# Patient Record
Sex: MALE | Race: WHITE | Employment: FULL TIME | URBAN - METROPOLITAN AREA
[De-identification: names, ages, dates, MRNs, and addresses within clinical notes are randomized per-mention and may not be internally consistent; named-entity substitution may affect disease eponyms.]

---

## 2017-08-02 PROBLEM — D64.9 ANEMIA: Status: ACTIVE | Noted: 2017-08-02

## 2020-11-09 ENCOUNTER — HOSPITAL ENCOUNTER (INPATIENT)
Age: 75
LOS: 2 days | Discharge: HOME HEALTH | DRG: 066 | End: 2020-11-11
Attending: EMERGENCY MEDICINE | Admitting: INTERNAL MEDICINE
Payer: COMMERCIAL

## 2020-11-09 ENCOUNTER — APPOINTMENT (OUTPATIENT)
Dept: CT IMAGING | Age: 75
DRG: 066 | End: 2020-11-09
Attending: EMERGENCY MEDICINE
Payer: COMMERCIAL

## 2020-11-09 DIAGNOSIS — I63.9 ACUTE ISCHEMIC STROKE (HCC): Primary | ICD-10-CM

## 2020-11-09 LAB
ALBUMIN SERPL-MCNC: 3.3 G/DL (ref 3.5–5)
ALBUMIN/GLOB SERPL: 1 {RATIO} (ref 1.1–2.2)
ALP SERPL-CCNC: 69 U/L (ref 45–117)
ALT SERPL-CCNC: 33 U/L (ref 12–78)
ANION GAP SERPL CALC-SCNC: 6 MMOL/L (ref 5–15)
AST SERPL-CCNC: 29 U/L (ref 15–37)
ATRIAL RATE: 65 BPM
BASOPHILS # BLD: 0 K/UL (ref 0–0.1)
BASOPHILS NFR BLD: 0 % (ref 0–1)
BILIRUB SERPL-MCNC: 0.6 MG/DL (ref 0.2–1)
BUN SERPL-MCNC: 26 MG/DL (ref 6–20)
BUN/CREAT SERPL: 20 (ref 12–20)
CALCIUM SERPL-MCNC: 9.5 MG/DL (ref 8.5–10.1)
CALCULATED P AXIS, ECG09: -11 DEGREES
CALCULATED R AXIS, ECG10: 30 DEGREES
CALCULATED T AXIS, ECG11: 24 DEGREES
CHLORIDE SERPL-SCNC: 101 MMOL/L (ref 97–108)
CO2 SERPL-SCNC: 28 MMOL/L (ref 21–32)
COMMENT, HOLDF: NORMAL
CREAT SERPL-MCNC: 1.29 MG/DL (ref 0.7–1.3)
DIAGNOSIS, 93000: NORMAL
DIFFERENTIAL METHOD BLD: ABNORMAL
EOSINOPHIL # BLD: 0.2 K/UL (ref 0–0.4)
EOSINOPHIL NFR BLD: 3 % (ref 0–7)
ERYTHROCYTE [DISTWIDTH] IN BLOOD BY AUTOMATED COUNT: 12.9 % (ref 11.5–14.5)
GLOBULIN SER CALC-MCNC: 3.3 G/DL (ref 2–4)
GLUCOSE BLD STRIP.AUTO-MCNC: 105 MG/DL (ref 65–100)
GLUCOSE SERPL-MCNC: 103 MG/DL (ref 65–100)
HCT VFR BLD AUTO: 35.2 % (ref 36.6–50.3)
HGB BLD-MCNC: 11.7 G/DL (ref 12.1–17)
IMM GRANULOCYTES # BLD AUTO: 0 K/UL (ref 0–0.04)
IMM GRANULOCYTES NFR BLD AUTO: 1 % (ref 0–0.5)
LYMPHOCYTES # BLD: 1.1 K/UL (ref 0.8–3.5)
LYMPHOCYTES NFR BLD: 12 % (ref 12–49)
MCH RBC QN AUTO: 33.1 PG (ref 26–34)
MCHC RBC AUTO-ENTMCNC: 33.2 G/DL (ref 30–36.5)
MCV RBC AUTO: 99.4 FL (ref 80–99)
MONOCYTES # BLD: 0.9 K/UL (ref 0–1)
MONOCYTES NFR BLD: 10 % (ref 5–13)
NEUTS SEG # BLD: 6.3 K/UL (ref 1.8–8)
NEUTS SEG NFR BLD: 74 % (ref 32–75)
NRBC # BLD: 0 K/UL (ref 0–0.01)
NRBC BLD-RTO: 0 PER 100 WBC
P-R INTERVAL, ECG05: 152 MS
PLATELET # BLD AUTO: 175 K/UL (ref 150–400)
PMV BLD AUTO: 9.5 FL (ref 8.9–12.9)
POTASSIUM SERPL-SCNC: 3.8 MMOL/L (ref 3.5–5.1)
PROT SERPL-MCNC: 6.6 G/DL (ref 6.4–8.2)
Q-T INTERVAL, ECG07: 412 MS
QRS DURATION, ECG06: 92 MS
QTC CALCULATION (BEZET), ECG08: 428 MS
RBC # BLD AUTO: 3.54 M/UL (ref 4.1–5.7)
SAMPLES BEING HELD,HOLD: NORMAL
SERVICE CMNT-IMP: ABNORMAL
SODIUM SERPL-SCNC: 135 MMOL/L (ref 136–145)
VENTRICULAR RATE, ECG03: 65 BPM
WBC # BLD AUTO: 8.5 K/UL (ref 4.1–11.1)

## 2020-11-09 PROCEDURE — 74011250636 HC RX REV CODE- 250/636: Performed by: INTERNAL MEDICINE

## 2020-11-09 PROCEDURE — 74011000636 HC RX REV CODE- 636: Performed by: EMERGENCY MEDICINE

## 2020-11-09 PROCEDURE — 70496 CT ANGIOGRAPHY HEAD: CPT

## 2020-11-09 PROCEDURE — 93005 ELECTROCARDIOGRAM TRACING: CPT

## 2020-11-09 PROCEDURE — 0042T CT CODE NEURO PERF W CBF: CPT

## 2020-11-09 PROCEDURE — 85025 COMPLETE CBC W/AUTO DIFF WBC: CPT

## 2020-11-09 PROCEDURE — 74011250637 HC RX REV CODE- 250/637: Performed by: INTERNAL MEDICINE

## 2020-11-09 PROCEDURE — 65660000000 HC RM CCU STEPDOWN

## 2020-11-09 PROCEDURE — 36415 COLL VENOUS BLD VENIPUNCTURE: CPT

## 2020-11-09 PROCEDURE — 74011250637 HC RX REV CODE- 250/637: Performed by: EMERGENCY MEDICINE

## 2020-11-09 PROCEDURE — 74011000250 HC RX REV CODE- 250: Performed by: INTERNAL MEDICINE

## 2020-11-09 PROCEDURE — 82962 GLUCOSE BLOOD TEST: CPT

## 2020-11-09 PROCEDURE — 70450 CT HEAD/BRAIN W/O DYE: CPT

## 2020-11-09 PROCEDURE — 80053 COMPREHEN METABOLIC PANEL: CPT

## 2020-11-09 PROCEDURE — 77030029684 HC NEB SM VOL KT MONA -A

## 2020-11-09 PROCEDURE — 99285 EMERGENCY DEPT VISIT HI MDM: CPT

## 2020-11-09 PROCEDURE — 94640 AIRWAY INHALATION TREATMENT: CPT

## 2020-11-09 RX ORDER — BUDESONIDE 0.5 MG/2ML
500 INHALANT ORAL 2 TIMES DAILY
Status: DISCONTINUED | OUTPATIENT
Start: 2020-11-09 | End: 2020-11-11

## 2020-11-09 RX ORDER — ONDANSETRON 2 MG/ML
4 INJECTION INTRAMUSCULAR; INTRAVENOUS
Status: DISCONTINUED | OUTPATIENT
Start: 2020-11-09 | End: 2020-11-11 | Stop reason: HOSPADM

## 2020-11-09 RX ORDER — SODIUM CHLORIDE 0.9 % (FLUSH) 0.9 %
10 SYRINGE (ML) INJECTION
Status: DISPENSED | OUTPATIENT
Start: 2020-11-09 | End: 2020-11-10

## 2020-11-09 RX ORDER — LEVOFLOXACIN 5 MG/ML
750 INJECTION, SOLUTION INTRAVENOUS ONCE
Status: DISCONTINUED | OUTPATIENT
Start: 2020-11-09 | End: 2020-11-09

## 2020-11-09 RX ORDER — ATORVASTATIN CALCIUM 40 MG/1
80 TABLET, FILM COATED ORAL DAILY
Status: DISCONTINUED | OUTPATIENT
Start: 2020-11-10 | End: 2020-11-10

## 2020-11-09 RX ORDER — LANOLIN ALCOHOL/MO/W.PET/CERES
325 CREAM (GRAM) TOPICAL
Status: DISCONTINUED | OUTPATIENT
Start: 2020-11-10 | End: 2020-11-11 | Stop reason: HOSPADM

## 2020-11-09 RX ORDER — ALBUTEROL SULFATE 0.83 MG/ML
2.5 SOLUTION RESPIRATORY (INHALATION)
Status: DISCONTINUED | OUTPATIENT
Start: 2020-11-09 | End: 2020-11-11 | Stop reason: HOSPADM

## 2020-11-09 RX ORDER — CLONIDINE HYDROCHLORIDE 0.1 MG/1
0.1 TABLET ORAL
Status: DISCONTINUED | OUTPATIENT
Start: 2020-11-09 | End: 2020-11-11 | Stop reason: HOSPADM

## 2020-11-09 RX ORDER — FAMOTIDINE 20 MG/1
40 TABLET, FILM COATED ORAL DAILY
Status: DISCONTINUED | OUTPATIENT
Start: 2020-11-10 | End: 2020-11-11 | Stop reason: HOSPADM

## 2020-11-09 RX ORDER — ASPIRIN 325 MG
325 TABLET, DELAYED RELEASE (ENTERIC COATED) ORAL DAILY
Status: DISCONTINUED | OUTPATIENT
Start: 2020-11-09 | End: 2020-11-11

## 2020-11-09 RX ORDER — ACETAMINOPHEN 325 MG/1
650 TABLET ORAL
Status: DISCONTINUED | OUTPATIENT
Start: 2020-11-09 | End: 2020-11-11 | Stop reason: HOSPADM

## 2020-11-09 RX ORDER — CETIRIZINE HCL 10 MG
10 TABLET ORAL DAILY
Status: DISCONTINUED | OUTPATIENT
Start: 2020-11-10 | End: 2020-11-11 | Stop reason: HOSPADM

## 2020-11-09 RX ORDER — GUAIFENESIN 100 MG/5ML
243 LIQUID (ML) ORAL
Status: COMPLETED | OUTPATIENT
Start: 2020-11-09 | End: 2020-11-09

## 2020-11-09 RX ORDER — ASPIRIN 325 MG
325 TABLET ORAL
Status: DISCONTINUED | OUTPATIENT
Start: 2020-11-09 | End: 2020-11-09

## 2020-11-09 RX ORDER — MONTELUKAST SODIUM 10 MG/1
10 TABLET ORAL DAILY
Status: DISCONTINUED | OUTPATIENT
Start: 2020-11-10 | End: 2020-11-11 | Stop reason: HOSPADM

## 2020-11-09 RX ORDER — SODIUM CHLORIDE AND POTASSIUM CHLORIDE .9; .15 G/100ML; G/100ML
SOLUTION INTRAVENOUS CONTINUOUS
Status: DISPENSED | OUTPATIENT
Start: 2020-11-09 | End: 2020-11-10

## 2020-11-09 RX ADMIN — IOPAMIDOL 140 ML: 755 INJECTION, SOLUTION INTRAVENOUS at 14:21

## 2020-11-09 RX ADMIN — ALBUTEROL SULFATE 2.5 MG: 2.5 SOLUTION RESPIRATORY (INHALATION) at 20:24

## 2020-11-09 RX ADMIN — ASPIRIN 243 MG: 81 TABLET, CHEWABLE ORAL at 15:38

## 2020-11-09 RX ADMIN — ASPIRIN 325 MG: 325 TABLET, COATED ORAL at 19:03

## 2020-11-09 RX ADMIN — POTASSIUM CHLORIDE AND SODIUM CHLORIDE: 900; 150 INJECTION, SOLUTION INTRAVENOUS at 19:03

## 2020-11-09 RX ADMIN — BUDESONIDE 500 MCG: 0.5 INHALANT RESPIRATORY (INHALATION) at 20:24

## 2020-11-09 NOTE — ROUTINE PROCESS
TRANSFER - OUT REPORT: 
 
Verbal report given to Laila BONNER(name) on [employer]  being transferred to [facility](unit) for routine progression of care Report consisted of patients Situation, Background, Assessment and  
Recommendations(SBAR). Information from the following report(s) SBAR, Kardex, ED Summary, [facility] and Recent Results was reviewed with the receiving nurse. Lines:  
Peripheral IV 11/09/20 Left Antecubital (Active) Site Assessment Clean, dry, & intact 11/09/20 1423 Phlebitis Assessment 0 11/09/20 1423 Infiltration Assessment 0 11/09/20 1423 Opportunity for questions and clarification was provided. Patient transported with: 
 Exosect

## 2020-11-09 NOTE — PROGRESS NOTES
Problem: Falls - Risk of  Goal: *Absence of Falls  Description: Document lEma Le Fall Risk and appropriate interventions in the flowsheet.   Outcome: Progressing Towards Goal  Note: Fall Risk Interventions:            Medication Interventions: Teach patient to arise slowly                   Problem: TIA/CVA Stroke: Day 2 Until Discharge  Goal: Nutrition/Diet  Outcome: Progressing Towards Goal  Goal: Respiratory  Outcome: Progressing Towards Goal  Goal: *Absence of aspiration  Outcome: Progressing Towards Goal     Problem: Ischemic Stroke: Discharge Outcomes  Goal: *Hemodynamically stable  Outcome: Progressing Towards Goal

## 2020-11-09 NOTE — PROGRESS NOTES
Neurocritical Care Code Stroke Documentation  Arrived at Code Stroke at 1355 pm, patient at [place] [id number]. Symptoms:   word finding difficulty, started at 7 pm last night, no associated weakness    Last Known Well: 7 pm yesterday   Medical hx: Asthma, high cholesterol, HTN, peptic ulcer, transient global amnesia, vertigo, patient reports similar episode about 8-9 years ago but reports he was not diagnosed with a stroke or TIA     Anticoagulation: 81 mg Aspirin daily   VAN:   Negative   NIHSS:   1a-LOC:0    1b-Month/Age:1    1c-Open/Close Hand:0    2-Best Gaze:0    3-Visual Fields:0    4-Facial Palsy:0    5a-Left Arm:0    5b-Right Arm:0    6a-Left Le    6b-Right Le    7-Limb Ataxia:0    8-Sensory:0    9-Best Language:1    10-Dysarthria:0    11-Extinction/Inattention:0  TOTAL SCORE:2   Imaging:   CT: Suspect acute ischemia left frontoparietal. No hemorrhage. CTA: There is thinning without occlusion of a anterior M2 segment on the left. There is occlusion of one of the M3 segment on the left corresponding  to the known left frontal infarct. Anterior and posterior circulation, right middle cerebral circulation show no  significant stenosis, filling defect. No definite evidence to suggest intracranial vascular malformation or aneurysm. CTP: Perfusion defect in left frontal lobe area, mismatch volume of 3 cc. Plan:   TPA Candidate: NO    Mechanical thrombectomy Candidate: NO     Discussed with Dr. Danielle Don (ED Physician) and Dr. Letty Gomez ([facility] on call). Dr. Letty Gomez reviewed imaging and no LVO seen per Letty Gomez. Time spent: 15 minutes.      Jay Anaya NP  Neurocritical Care Nurse Practitioner  [phone]

## 2020-11-09 NOTE — ED TRIAGE NOTES
Pt arrives to triage, +expressive aphasia, unable to answer questions in triage. ..waiting for family who is parking the car , pt stated difficulty getting words out started at 7pm-waiting on family to confirm, denies headache, +dizziness, denies numbness or tingling , denies visual difficulty, code S called while waiting

## 2020-11-09 NOTE — ED PROVIDER NOTES
Mr. Elizabet Yanez is a 81yo male who presents to the ER with complaints of word finding difficulties. His symptoms started at 7 PM yesterday. He said that he felt well prior to this episode. He said the symptoms have never really changed and have been present the whole time since it started yesterday. He denies any numbness, tingling, or weakness. No fevers or chills. No difficulty walking. No changes with his vision. He still is able to speak some but he often has to stop because he cannot get out the correct words. He reports similar symptoms 1 time a number of years ago. He is not sure he was diagnosed with at that time. He said that he is not on any blood thinners. He denies any other complaints. The patient's history is limited by his ability to words. Much of the history is obtained by making suggestions and the patient will answer the question afterwards.            Past Medical History:   Diagnosis Date    Asthma     High cholesterol     Hypertension     Peptic ulcer     on med- no problems since    TGA (transient global amnesia) 2014    Vertigo 2012       Past Surgical History:   Procedure Laterality Date    ENDOSCOPY, COLON, DIAGNOSTIC      neg    HX HERNIA REPAIR      HX ORTHOPAEDIC      left shoulder 2011    HX TONSILLECTOMY           Family History:   Problem Relation Age of Onset    Stroke Mother          CVA age 52's       Social History     Socioeconomic History    Marital status:      Spouse name: Not on file    Number of children: Not on file    Years of education: Not on file    Highest education level: Not on file   Occupational History    Not on file   Social Needs    Financial resource strain: Not on file    Food insecurity     Worry: Not on file     Inability: Not on file    Transportation needs     Medical: Not on file     Non-medical: Not on file   Tobacco Use    Smoking status: Never Smoker    Smokeless tobacco: Never Used Substance and Sexual Activity    Alcohol use: Yes     Alcohol/week: 5.8 standard drinks     Types: 7 drink(s) per week    Drug use: No    Sexual activity: Not on file   Lifestyle    Physical activity     Days per week: Not on file     Minutes per session: Not on file    Stress: Not on file   Relationships    Social connections     Talks on phone: Not on file     Gets together: Not on file     Attends Yazidi service: Not on file     Active member of club or organization: Not on file     Attends meetings of clubs or organizations: Not on file     Relationship status: Not on file    Intimate partner violence     Fear of current or ex partner: Not on file     Emotionally abused: Not on file     Physically abused: Not on file     Forced sexual activity: Not on file   Other Topics Concern    Not on file   Social History Narrative    Not on file         ALLERGIES: Peanut    Review of Systems   Constitutional: Negative for chills and fever. HENT: Negative for rhinorrhea and sore throat. Respiratory: Negative for cough and shortness of breath. Cardiovascular: Negative for chest pain. Gastrointestinal: Negative for abdominal pain, diarrhea, nausea and vomiting. Genitourinary: Negative for dysuria and hematuria. Musculoskeletal: Negative for arthralgias and myalgias. Skin: Negative for pallor and rash. Neurological: Positive for speech difficulty. Negative for dizziness, weakness and light-headedness. All other systems reviewed and are negative. Vitals:    11/09/20 1349   BP: 137/84   Pulse: 75   Resp: 16   Temp: 97.7 °F (36.5 °C)   SpO2: 95%            Physical Exam     Vital signs reviewed. Nursing notes reviewed.     Const:  No acute distress, well developed, well nourished  Head:  Atraumatic, normocephalic  Eyes:  PERRL, conjunctiva normal, no scleral icterus, extraocular movements intact, visual fields intact  Neck:  Supple, trachea midline  Cardiovascular: Regular rate  Resp:  No resp distress, no increased work of breathing  Abd:  Soft, non-tender, non-distended, no rebound  MSK:  No pedal edema, normal ROM  Neuro:  Alert and awake, no cranial nerve defect, equal strength in upper and lower extremities, steady gait,  Skin:  Warm, dry, intact  Psych: normal mood and affect, behavior is normal, judgement and thought content is normal          MDM  Number of Diagnoses or Management Options     Amount and/or Complexity of Data Reviewed  Clinical lab tests: ordered and reviewed  Tests in the radiology section of CPT®: ordered and reviewed  Review and summarize past medical records: yes    Patient Progress  Patient progress: stable         Procedures      Perfect Serve Consult for Admission  3:38 PM    ED Room Number: ER07/07  Patient Name and age:  Radu Whitmore 70 y.o.  male  Working Diagnosis:   1. Acute ischemic stroke ([address]        COVID-19 Suspicion:  no  Sepsis present:  no  Reassessment needed: no  Code Status:  Full Code  Readmission: no  Isolation Requirements:  no  Recommended Level of Care:  telemetry  Department:  [facility] adult         Mr. Rosalia Boxer is a 79yo male who presents to the ER with complaints of difficulty speaking and word finding difficulties. Acute stroke seen on CT. No lesion amendable to thrombectomy on CTA. Pt. To be evaluated for admission by the PCP.

## 2020-11-09 NOTE — H&P
History and Physical    Subjective:     Stu Manzano is a 70 y.o. white male with PMH significant for HTN, hyperlipidemia, and TGA a few years ago. He has been on baby asa every day as well as statin & losartan/hctz. Last evening, he noticed some trouble speaking. His wife says he just did not say much. This am, it was clear he was having some expressive aphasia, so he was brought to the ER where CT showed apparent L frontal-parietal acute ischemia. Teleneurology saw pt, and pt TPA not an option. Intervention not an option either. Pt being admitted for acute CVA mgmt & w/u. Pt denies any other focal neurologic sx's. No CP/SOB. No f/c. No other new c/o's. Past Medical History:   Diagnosis Date    Asthma     High cholesterol     Hypertension     Peptic ulcer 2000    on med- no problems since    TGA (transient global amnesia) 12/22/2014    Vertigo Nov. 2012     Allergies   Allergen Reactions    Peanut Unknown (comments)     Prior to Admission medications    Medication Sig Start Date End Date Taking? Authorizing Provider   levocetirizine (XYZAL) 5 mg tablet Take 1 Tab by mouth daily. 10/26/20   Terresa Bernheim, MD   simvastatin (ZOCOR) 20 mg tablet TAKE 1 TABLET DAILY 4/6/20   Terresa Bernheim, MD   famotidine (PEPCID) 40 mg tablet TAKE 1 TABLET DAILY 4/6/20   Terresa Bernheim, MD   fluticasone propionate (FLOVENT HFA) 110 mcg/actuation inhaler Take 2 Puffs by inhalation every twelve (12) hours. Rinse mouth after use 11/12/19   Chika Brown PA   montelukast (SINGULAIR) 10 mg tablet Take 1 Tab by mouth daily. 11/12/19   Norman Brown PA   losartan-hydroCHLOROthiazide (HYZAAR) 100-12.5 mg per tablet TAKE 1 TABLET DAILY 8/25/19   Terresa Bernheim, MD   butalbital-acetaminophen-caffeine (FIORICET, ESGIC) -40 mg per tablet Take 1-2 Tabs by mouth every six (6) hours as needed for Pain.  1/31/17   Terresa Bernheim, MD   albuterol (PROVENTIL HFA, VENTOLIN HFA, PROAIR HFA) 90 mcg/actuation inhaler Take 2 Puffs by inhalation four (4) times daily as needed for Wheezing. 17   Eben Garza MD   vitamin c-vitamin e [facility] CONCENTRATE) cap Take 1 capsule by mouth daily. Provider, Historical   cyanocobalamin (VITAMIN B12) 500 mcg tablet Take 500 mcg by mouth daily. Other, MD Jose De Jesus   multivitamin (ONE A DAY) tablet Take 1 Tab by mouth daily. Other, MD Jose De Jesus   aspirin delayed-release 81 mg tablet Take  by mouth daily. Provider, Historical   ferrous sulfate (IRON) 325 mg (65 mg iron) tablet Take 325 mg by mouth daily (before breakfast). Provider, Historical     Social History     Tobacco Use    Smoking status: Never Smoker    Smokeless tobacco: Never Used   Substance Use Topics    Alcohol use: Yes     Alcohol/week: 5.8 standard drinks     Types: 7 drink(s) per week     Family History   Problem Relation Age of Onset    Stroke Mother          CVA age 52's               Review of Systems:  As above. Objective:       Physical Exam:   In NAD. A&O. HEENT -- Unremarkable. Neck -- Supple. No JVD. No CB's. Heart -- RRR. No R/M/G. Lungs -- CTA. Abdomen -- Soft. Non-tender. Non-distended. No masses. Bowel sounds present. Extremeties -- No edema. Neuro -- Grossly non-focal.  Minimal, if any, residual expressive aphasia.         Data Review:   Recent Results (from the past 24 hour(s))   GLUCOSE, POC    Collection Time: 20  1:53 PM   Result Value Ref Range    Glucose (POC) 105 (H) 65 - 100 mg/dL    Performed by Joao Carreon    METABOLIC PANEL, COMPREHENSIVE    Collection Time: 20  2:27 PM   Result Value Ref Range    Sodium 135 (L) 136 - 145 mmol/L    Potassium 3.8 3.5 - 5.1 mmol/L    Chloride 101 97 - 108 mmol/L    CO2 28 21 - 32 mmol/L    Anion gap 6 5 - 15 mmol/L    Glucose 103 (H) 65 - 100 mg/dL    BUN 26 (H) 6 - 20 MG/DL    Creatinine 1.29 0.70 - 1.30 MG/DL    BUN/Creatinine ratio 20 12 - 20 GFR est AA >60 >60 ml/min/1.73m2    GFR est non-AA 55 (L) >60 ml/min/1.73m2    Calcium 9.5 8.5 - 10.1 MG/DL    Bilirubin, total 0.6 0.2 - 1.0 MG/DL    ALT (SGPT) 33 12 - 78 U/L    AST (SGOT) 29 15 - 37 U/L    Alk. phosphatase 69 45 - 117 U/L    Protein, total 6.6 6.4 - 8.2 g/dL    Albumin 3.3 (L) 3.5 - 5.0 g/dL    Globulin 3.3 2.0 - 4.0 g/dL    A-G Ratio 1.0 (L) 1.1 - 2.2     CBC WITH AUTOMATED DIFF    Collection Time: 11/09/20  2:27 PM   Result Value Ref Range    WBC 8.5 4.1 - 11.1 K/uL    RBC 3.54 (L) 4.10 - 5.70 M/uL    HGB 11.7 (L) 12.1 - 17.0 g/dL    HCT 35.2 (L) 36.6 - 50.3 %    MCV 99.4 (H) 80.0 - 99.0 FL    MCH 33.1 26.0 - 34.0 PG    MCHC 33.2 30.0 - 36.5 g/dL    RDW 12.9 11.5 - 14.5 %    PLATELET 110 226 - 512 K/uL    MPV 9.5 8.9 - 12.9 FL    NRBC 0.0 0  WBC    ABSOLUTE NRBC 0.00 0.00 - 0.01 K/uL    NEUTROPHILS 74 32 - 75 %    LYMPHOCYTES 12 12 - 49 %    MONOCYTES 10 5 - 13 %    EOSINOPHILS 3 0 - 7 %    BASOPHILS 0 0 - 1 %    IMMATURE GRANULOCYTES 1 (H) 0.0 - 0.5 %    ABS. NEUTROPHILS 6.3 1.8 - 8.0 K/UL    ABS. LYMPHOCYTES 1.1 0.8 - 3.5 K/UL    ABS. MONOCYTES 0.9 0.0 - 1.0 K/UL    ABS. EOSINOPHILS 0.2 0.0 - 0.4 K/UL    ABS. BASOPHILS 0.0 0.0 - 0.1 K/UL    ABS. IMM. GRANS. 0.0 0.00 - 0.04 K/UL    DF AUTOMATED     SAMPLES BEING HELD    Collection Time: 11/09/20  2:27 PM   Result Value Ref Range    SAMPLES BEING HELD 1BLU,1RED     COMMENT        Add-on orders for these samples will be processed based on acceptable specimen integrity and analyte stability, which may vary by analyte.    EKG, 12 LEAD, INITIAL    Collection Time: 11/09/20  3:05 PM   Result Value Ref Range    Ventricular Rate 65 BPM    Atrial Rate 65 BPM    P-R Interval 152 ms    QRS Duration 92 ms    Q-T Interval 412 ms    QTC Calculation (Bezet) 428 ms    Calculated P Axis -11 degrees    Calculated R Axis 30 degrees    Calculated T Axis 24 degrees    Diagnosis       Normal sinus rhythm  When compared with ECG of 22-DEC-2014 14:17,  No significant change was found  Confirmed by Ney Alcala MD, Tierra Nathaly ([id number]) on 11/9/2020 4:46:04 PM           Assessment:     Principal Problem:    CVA (cerebral vascular accident) ([facility] [address] (11/9/2020)      Active Problems:    Hyperlipidemia (11/1/2011)      Essential hypertension (6/3/2015)        Plan:     1. Admit Neurotele. 2.  Increase ASA to 325 mg PO every day. 3.  Increase statin to atorvastatin 80 mg every day. 4.  Hold Hyzaar for permissive HTN. PRN clonidine for extreme BP elevation. 5.  Brain MRI, carotid dopplers, ECHO. 6.  Neurology consult in am, PT/OT/ST. 7.  SCD's for DVT prophylaxis. D/w wife, Judy Becky [phone]) and Lora Cisneros RN.               Signed By: Germain Rico MD     November 9, 2020

## 2020-11-10 ENCOUNTER — APPOINTMENT (OUTPATIENT)
Dept: NON INVASIVE DIAGNOSTICS | Age: 75
DRG: 066 | End: 2020-11-10
Attending: INTERNAL MEDICINE
Payer: COMMERCIAL

## 2020-11-10 ENCOUNTER — APPOINTMENT (OUTPATIENT)
Dept: MRI IMAGING | Age: 75
DRG: 066 | End: 2020-11-10
Attending: INTERNAL MEDICINE
Payer: COMMERCIAL

## 2020-11-10 ENCOUNTER — APPOINTMENT (OUTPATIENT)
Dept: VASCULAR SURGERY | Age: 75
DRG: 066 | End: 2020-11-10
Attending: INTERNAL MEDICINE
Payer: COMMERCIAL

## 2020-11-10 LAB
ANION GAP SERPL CALC-SCNC: 5 MMOL/L (ref 5–15)
BASOPHILS # BLD: 0 K/UL (ref 0–0.1)
BASOPHILS NFR BLD: 0 % (ref 0–1)
BUN SERPL-MCNC: 26 MG/DL (ref 6–20)
BUN/CREAT SERPL: 22 (ref 12–20)
CALCIUM SERPL-MCNC: 9.2 MG/DL (ref 8.5–10.1)
CHLORIDE SERPL-SCNC: 108 MMOL/L (ref 97–108)
CHOLEST SERPL-MCNC: 155 MG/DL
CO2 SERPL-SCNC: 25 MMOL/L (ref 21–32)
CREAT SERPL-MCNC: 1.16 MG/DL (ref 0.7–1.3)
DIFFERENTIAL METHOD BLD: ABNORMAL
ECHO AO ROOT DIAM: 3.67 CM
ECHO AV AREA PEAK VELOCITY: 3.1 CM2
ECHO AV AREA/BSA PEAK VELOCITY: 1.6 CM2/M2
ECHO AV PEAK GRADIENT: 5.01 MMHG
ECHO AV PEAK VELOCITY: 111.93 CM/S
ECHO LA MAJOR AXIS: 3.64 CM
ECHO LA MINOR AXIS: 1.9 CM
ECHO LV E' LATERAL VELOCITY: 10.27 CM/S
ECHO LV E' SEPTAL VELOCITY: 5.57 CM/S
ECHO LV INTERNAL DIMENSION DIASTOLIC: 4.63 CM (ref 4.2–5.9)
ECHO LV INTERNAL DIMENSION SYSTOLIC: 3.42 CM
ECHO LV IVSD: 1.24 CM (ref 0.6–1)
ECHO LV MASS 2D: 210.8 G (ref 88–224)
ECHO LV MASS INDEX 2D: 110.1 G/M2 (ref 49–115)
ECHO LV POSTERIOR WALL DIASTOLIC: 1.19 CM (ref 0.6–1)
ECHO LVOT DIAM: 2.13 CM
ECHO LVOT PEAK GRADIENT: 3.78 MMHG
ECHO LVOT PEAK VELOCITY: 97.23 CM/S
ECHO MV A VELOCITY: 75.14 CM/S
ECHO MV AREA PHT: 3.45 CM2
ECHO MV E DECELERATION TIME (DT): 220 MS
ECHO MV E VELOCITY: 58.57 CM/S
ECHO MV E/A RATIO: 0.78
ECHO MV E/E' LATERAL: 5.7
ECHO MV E/E' RATIO (AVERAGED): 8.11
ECHO MV E/E' SEPTAL: 10.52
ECHO MV PRESSURE HALF TIME (PHT): 63.8 MS
ECHO PV MAX VELOCITY: 72.75 CM/S
ECHO PV PEAK INSTANTANEOUS GRADIENT SYSTOLIC: 2.12 MMHG
ECHO RV INTERNAL DIMENSION: 3.17 CM
ECHO RV TAPSE: 2.23 CM (ref 1.5–2)
EOSINOPHIL # BLD: 0.2 K/UL (ref 0–0.4)
EOSINOPHIL NFR BLD: 2 % (ref 0–7)
ERYTHROCYTE [DISTWIDTH] IN BLOOD BY AUTOMATED COUNT: 13.2 % (ref 11.5–14.5)
FERRITIN SERPL-MCNC: 316 NG/ML (ref 26–388)
GLUCOSE SERPL-MCNC: 115 MG/DL (ref 65–100)
HCT VFR BLD AUTO: 33.9 % (ref 36.6–50.3)
HDLC SERPL-MCNC: 61 MG/DL
HDLC SERPL: 2.5 {RATIO} (ref 0–5)
HGB BLD-MCNC: 11.3 G/DL (ref 12.1–17)
IMM GRANULOCYTES # BLD AUTO: 0 K/UL (ref 0–0.04)
IMM GRANULOCYTES NFR BLD AUTO: 0 % (ref 0–0.5)
IRON SATN MFR SERPL: 9 % (ref 20–50)
IRON SERPL-MCNC: 32 UG/DL (ref 35–150)
LDLC SERPL CALC-MCNC: 50 MG/DL (ref 0–100)
LEFT CCA DIST DIAS: 19.8 CM/S
LEFT CCA DIST SYS: 93.6 CM/S
LEFT CCA PROX DIAS: 22.3 CM/S
LEFT CCA PROX SYS: 138.6 CM/S
LEFT ECA DIAS: 4.73 CM/S
LEFT ECA SYS: 53.1 CM/S
LEFT ICA DIST DIAS: 31.5 CM/S
LEFT ICA DIST SYS: 94.9 CM/S
LEFT ICA MID DIAS: 17.8 CM/S
LEFT ICA MID SYS: 51.7 CM/S
LEFT ICA PROX DIAS: 13.3 CM/S
LEFT ICA PROX SYS: 70.3 CM/S
LEFT ICA/CCA SYS: 1.01
LEFT VERTEBRAL DIAS: 8.11 CM/S
LEFT VERTEBRAL SYS: 47.4 CM/S
LIPID PROFILE,FLP: ABNORMAL
LYMPHOCYTES # BLD: 1 K/UL (ref 0.8–3.5)
LYMPHOCYTES NFR BLD: 12 % (ref 12–49)
MCH RBC QN AUTO: 32.8 PG (ref 26–34)
MCHC RBC AUTO-ENTMCNC: 33.3 G/DL (ref 30–36.5)
MCV RBC AUTO: 98.5 FL (ref 80–99)
MONOCYTES # BLD: 0.9 K/UL (ref 0–1)
MONOCYTES NFR BLD: 11 % (ref 5–13)
NEUTS SEG # BLD: 6.2 K/UL (ref 1.8–8)
NEUTS SEG NFR BLD: 75 % (ref 32–75)
NRBC # BLD: 0 K/UL (ref 0–0.01)
NRBC BLD-RTO: 0 PER 100 WBC
PLATELET # BLD AUTO: 164 K/UL (ref 150–400)
PMV BLD AUTO: 9.7 FL (ref 8.9–12.9)
POTASSIUM SERPL-SCNC: 3.8 MMOL/L (ref 3.5–5.1)
RBC # BLD AUTO: 3.44 M/UL (ref 4.1–5.7)
RIGHT CCA DIST DIAS: 19.4 CM/S
RIGHT CCA DIST SYS: 95 CM/S
RIGHT CCA PROX DIAS: 17.9 CM/S
RIGHT CCA PROX SYS: 103.8 CM/S
RIGHT ECA DIAS: 16.44 CM/S
RIGHT ECA SYS: 137.2 CM/S
RIGHT ICA DIST DIAS: 20.7 CM/S
RIGHT ICA DIST SYS: 63.7 CM/S
RIGHT ICA MID DIAS: 23 CM/S
RIGHT ICA MID SYS: 71.6 CM/S
RIGHT ICA PROX DIAS: 13.2 CM/S
RIGHT ICA PROX SYS: 59 CM/S
RIGHT ICA/CCA SYS: 0.8
RIGHT VERTEBRAL DIAS: 22.31 CM/S
RIGHT VERTEBRAL SYS: 79.5 CM/S
SODIUM SERPL-SCNC: 138 MMOL/L (ref 136–145)
TIBC SERPL-MCNC: 342 UG/DL (ref 250–450)
TRIGL SERPL-MCNC: 220 MG/DL (ref ?–150)
VLDLC SERPL CALC-MCNC: 44 MG/DL
WBC # BLD AUTO: 8.3 K/UL (ref 4.1–11.1)

## 2020-11-10 PROCEDURE — 93306 TTE W/DOPPLER COMPLETE: CPT | Performed by: INTERNAL MEDICINE

## 2020-11-10 PROCEDURE — 97112 NEUROMUSCULAR REEDUCATION: CPT

## 2020-11-10 PROCEDURE — 36415 COLL VENOUS BLD VENIPUNCTURE: CPT

## 2020-11-10 PROCEDURE — 97165 OT EVAL LOW COMPLEX 30 MIN: CPT

## 2020-11-10 PROCEDURE — 93880 EXTRACRANIAL BILAT STUDY: CPT

## 2020-11-10 PROCEDURE — 74011250637 HC RX REV CODE- 250/637: Performed by: INTERNAL MEDICINE

## 2020-11-10 PROCEDURE — 70553 MRI BRAIN STEM W/O & W/DYE: CPT

## 2020-11-10 PROCEDURE — 97161 PT EVAL LOW COMPLEX 20 MIN: CPT

## 2020-11-10 PROCEDURE — 92523 SPEECH SOUND LANG COMPREHEN: CPT | Performed by: SPEECH-LANGUAGE PATHOLOGIST

## 2020-11-10 PROCEDURE — 80048 BASIC METABOLIC PNL TOTAL CA: CPT

## 2020-11-10 PROCEDURE — 74011250636 HC RX REV CODE- 250/636: Performed by: INTERNAL MEDICINE

## 2020-11-10 PROCEDURE — 80061 LIPID PANEL: CPT

## 2020-11-10 PROCEDURE — 65660000000 HC RM CCU STEPDOWN

## 2020-11-10 PROCEDURE — 82728 ASSAY OF FERRITIN: CPT

## 2020-11-10 PROCEDURE — 99255 IP/OBS CONSLTJ NEW/EST HI 80: CPT | Performed by: PSYCHIATRY & NEUROLOGY

## 2020-11-10 PROCEDURE — 83540 ASSAY OF IRON: CPT

## 2020-11-10 PROCEDURE — A9575 INJ GADOTERATE MEGLUMI 0.1ML: HCPCS | Performed by: INTERNAL MEDICINE

## 2020-11-10 PROCEDURE — 74011250637 HC RX REV CODE- 250/637: Performed by: NURSE PRACTITIONER

## 2020-11-10 PROCEDURE — 93306 TTE W/DOPPLER COMPLETE: CPT

## 2020-11-10 PROCEDURE — 85025 COMPLETE CBC W/AUTO DIFF WBC: CPT

## 2020-11-10 PROCEDURE — 97530 THERAPEUTIC ACTIVITIES: CPT

## 2020-11-10 RX ORDER — ATORVASTATIN CALCIUM 40 MG/1
40 TABLET, FILM COATED ORAL DAILY
Status: DISCONTINUED | OUTPATIENT
Start: 2020-11-11 | End: 2020-11-11 | Stop reason: HOSPADM

## 2020-11-10 RX ORDER — SODIUM CHLORIDE 0.9 % (FLUSH) 0.9 %
10 SYRINGE (ML) INJECTION
Status: COMPLETED | OUTPATIENT
Start: 2020-11-10 | End: 2020-11-10

## 2020-11-10 RX ORDER — CLOPIDOGREL BISULFATE 75 MG/1
300 TABLET ORAL ONCE
Status: COMPLETED | OUTPATIENT
Start: 2020-11-10 | End: 2020-11-10

## 2020-11-10 RX ORDER — GADOTERATE MEGLUMINE 376.9 MG/ML
15 INJECTION INTRAVENOUS
Status: COMPLETED | OUTPATIENT
Start: 2020-11-10 | End: 2020-11-10

## 2020-11-10 RX ADMIN — ATORVASTATIN CALCIUM 80 MG: 40 TABLET, FILM COATED ORAL at 09:43

## 2020-11-10 RX ADMIN — GADOTERATE MEGLUMINE 15 ML: 376.9 INJECTION INTRAVENOUS at 01:06

## 2020-11-10 RX ADMIN — FERROUS SULFATE TAB 325 MG (65 MG ELEMENTAL FE) 325 MG: 325 (65 FE) TAB at 07:35

## 2020-11-10 RX ADMIN — MONTELUKAST 10 MG: 10 TABLET, FILM COATED ORAL at 09:43

## 2020-11-10 RX ADMIN — ASPIRIN 325 MG: 325 TABLET, COATED ORAL at 09:43

## 2020-11-10 RX ADMIN — CETIRIZINE HYDROCHLORIDE 10 MG: 10 TABLET, FILM COATED ORAL at 09:43

## 2020-11-10 RX ADMIN — Medication 10 ML: at 01:07

## 2020-11-10 RX ADMIN — CLOPIDOGREL BISULFATE 300 MG: 75 TABLET ORAL at 20:46

## 2020-11-10 RX ADMIN — FAMOTIDINE 40 MG: 20 TABLET ORAL at 09:42

## 2020-11-10 NOTE — PROGRESS NOTES
Transition of Care Plan:        · RUR:  12 % GLOS:  Not Assigned   LOS:  0  Core Measure  · Dx:  Acute Infarction in anterior superior front lobe  · Admitted to Hospitalist, Dr. Colby Corea to follow  · Will follow PT/OT/ST recommendations and discuss with Mr. Luis Alberto Hewitt, he reports speech as getting better  · Anticipate discharge to home, will follow for recommendations  · Disposition:  Return to home, he still works  · Transportation - he will have a family member pick him up    Reason for Admission:    + infarct                    RUR Score:         12%   Core Measure            Plan for utilizing home health:      TBD    PCP: First and Last name:  Dr. Zeb Spurling   [facility] of Practice:   [facility]   Are you a current patient: Yes/No:  Yes   Approximate date of last visit: 10/2020  Can you participate in a virtual visit with your PCP:  Yes                    Current Advanced Directive/Advance Care Plan:   Full Code, No ACP on file                          Transition of Care Plan:            Arthur Bronson is a 70year old male to [facility] ED with complaints of word finding difficulty, no other deficits. ED work up, Neuro consulted. MRI showed moderate acute infarction in anterior superior left frontal lobe. Admitted to hospitalist, PCP to follow. Verified face sheet/demographics. He does not have Medicare, he works Full Time. Care Management Interventions  PCP Verified by CM: Yes(Dr. Zeb Spurling)  Last Visit to PCP: 10/26/20  Palliative Care Criteria Met (RRAT>21 & CHF Dx)?: No  Transition of Care Consult (CM Consult): Discharge Planning, Other(Stroke Assessement   Core Measure   )  MyChart Signup: No  Discharge Durable Medical Equipment: No  Health Maintenance Reviewed: Yes  Physical Therapy Consult: Yes  Occupational Therapy Consult: Yes  Speech Therapy Consult: Yes  Current Support Network: Lives Alone(Good supportive family and friends.  work friends)  The Plan for Transition of Care is Related to the Following Treatment Goals : Lower level of care    Vasiliy Benavidez RN, BSN, [facility]  ED Care Management  [phone]

## 2020-11-10 NOTE — PROGRESS NOTES
Bedside and Verbal shift change report given to Smitha Arias RN (oncoming nurse) by Odilia Tejada RN (offgoing nurse). Report included the following information SBAR, Kardex, Intake/Output, MAR, Recent Results, Med Rec Status, Cardiac Rhythm NSR, Alarm Parameters  and Dual Neuro Assessment.

## 2020-11-10 NOTE — PROGRESS NOTES
Admission Medication Reconciliation:    Information obtained from:  Rx query, H and P  RxQuery data available¹:  YES    Comments/Recommendations: Updated PTA meds/reviewed patient's allergies. 1)  Medication list reviewed, no changes at this time     [facility] benefit data reflects medications filled and processed through the patient's insurance, however   this data does NOT capture whether the medication was picked up or is currently being taken by the patient. Allergies:  Peanut    Significant PMH/Disease States:   Past Medical History:   Diagnosis Date    Asthma     High cholesterol     Hypertension     Peptic ulcer 2000    on med- no problems since    TGA (transient global amnesia) 12/22/2014    Vertigo Nov. 2012     Chief Complaint for this Admission:    Chief Complaint   Patient presents with    Altered mental status     Prior to Admission Medications:   Prior to Admission Medications   Prescriptions Last Dose Informant Taking? albuterol (PROVENTIL HFA, VENTOLIN HFA, PROAIR HFA) 90 mcg/actuation inhaler   Yes   Sig: Take 2 Puffs by inhalation four (4) times daily as needed for Wheezing. aspirin delayed-release 81 mg tablet  Self Yes   Sig: Take  by mouth daily. butalbital-acetaminophen-caffeine (FIORICET, ESGIC) -40 mg per tablet   Yes   Sig: Take 1-2 Tabs by mouth every six (6) hours as needed for Pain. cyanocobalamin (VITAMIN B12) 500 mcg tablet  Self Yes   Sig: Take 500 mcg by mouth daily. famotidine (PEPCID) 40 mg tablet   Yes   Sig: TAKE 1 TABLET DAILY   ferrous sulfate (IRON) 325 mg (65 mg iron) tablet  Self Yes   Sig: Take 325 mg by mouth daily (before breakfast). fluticasone propionate (FLOVENT HFA) 110 mcg/actuation inhaler   Yes   Sig: Take 2 Puffs by inhalation every twelve (12) hours. Rinse mouth after use   levocetirizine (XYZAL) 5 mg tablet   Yes   Sig: Take 1 Tab by mouth daily.    losartan-hydroCHLOROthiazide (HYZAAR) 100-12.5 mg per tablet   Yes   Sig: TAKE 1 TABLET DAILY   montelukast (SINGULAIR) 10 mg tablet   Yes   Sig: Take 1 Tab by mouth daily. multivitamin (ONE A DAY) tablet  Self Yes   Sig: Take 1 Tab by mouth daily. simvastatin (ZOCOR) 20 mg tablet   Yes   Sig: TAKE 1 TABLET DAILY   vitamin c-vitamin e (CRANBERRY CONCENTRATE) cap  Self Yes   Sig: Take 1 capsule by mouth daily. Facility-Administered Medications: None       Please contact the main inpatient pharmacy with any questions or concerns at [phone] and we will direct you to the clinical pharmacist covering this patient's care while in-house.    Morgan Tidwell, PHARMD

## 2020-11-10 NOTE — PROGRESS NOTES
PHYSICAL THERAPY EVALUATION/DISCHARGE  Patient: Ellie Murcia (6 y.o. male)  Date: 11/10/2020  Primary Diagnosis: CVA (cerebral vascular accident) [facility]) [I63.9]       Precautions:        ASSESSMENT  Based on the objective data described below, the patient presents with overall mobility at/near baseline functional s/p admission for CVA - note imaging positive for a left frontal infarct. At baseline, pt lives with his wife and is fully independent with mobility and ADLs. This date, he presented overall mobility intact - vision, coordination, strength, sensation, and balance at baseline level. He did present with aphasia (improving since admission but not fully resolved). During ambulation, he demonstrated difficulty scanning the periphery of his environment bilaterally, impaired left right discrimination, and path deviation. Educated patient on BE FAST acronym for signs/symptoms of stroke and provided a handout. Pt would benefit from home health PT for a home safety evaluation. He has no further acute PT needs, will sign off. Functional Outcome Measure: The patient scored 56/56 on the Ronquillo outcome measure which is indicative of low fall risk. Other factors to consider for discharge: independent baseline, aphasic, lives with wife      Further skilled acute physical therapy is not indicated at this time. PLAN :  Recommendation for discharge: (in order for the patient to meet his/her long term goals)  Home health PT for home safety evaluation     This discharge recommendation:  Has not yet been discussed the attending provider and/or case management    IF patient discharges home will need the following DME: none       SUBJECTIVE:   Patient stated I still work. I'm and .     OBJECTIVE DATA SUMMARY:   HISTORY:    Past Medical History:   Diagnosis Date    Asthma     High cholesterol     Hypertension     Peptic ulcer 2000    on med- no problems since    TGA (transient global amnesia) 2014    Vertigo 2012     Past Surgical History:   Procedure Laterality Date    ENDOSCOPY, COLON, DIAGNOSTIC      neg    HX HERNIA REPAIR      HX ORTHOPAEDIC      left shoulder 2011    HX TONSILLECTOMY         Prior level of function: Pt lives with his wife and was fully independent with mobility and ADLs    Home Situation  Home Environment: Apartment  # Steps to Enter: 3  One/Two Story Residence: One story  Living Alone: No  Support Systems: Spouse/Significant Other/Partner, Family member(s)  Patient Expects to be Discharged to[de-identified] Private residence  Current DME Used/Available at Home: None    EXAMINATION/PRESENTATION/DECISION MAKING:   Critical Behavior:  Neurologic State: Alert  Orientation Level: Oriented X4  Cognition: Follows commands     Hearing: Auditory  Auditory Impairment: None    Range Of Motion:  AROM: Within functional limits           PROM: Within functional limits           Strength:    Strength:  Within functional limits                    Tone & Sensation:   Tone: Normal              Sensation: Intact               Coordination:  Coordination: Within functional limits  Vision:      Functional Mobility:  Bed Mobility:     Supine to Sit: Independent  Sit to Supine: Independent  Scooting: Independent  Transfers:  Sit to Stand: Independent  Stand to Sit: Independent                       Balance:   Sitting: Intact  Standing: Intact  Ambulation/Gait Training:  Distance (ft): 300 Feet (ft)  Assistive Device: Gait belt  Ambulation - Level of Assistance: Independent        Gait Abnormalities: Path deviations    Functional Measure:  Ronquillo Balance Test:    Sitting to Standin  Standing Unsupported: 4  Sitting with Back Unsupported: 4  Standing to Sittin  Transfers: 4  Standing Unsupported with Eyes Closed: 4  Standing Unsupported with Feet Together: 4  Reach Forward with Outstretched Arm: 4   Object: 4  Turn to Look Over Shoulders: 4  Turn 360 Degrees: 4  Alternate Foot on Step/Stool: 4  Standing Unsupported One Foot in Front: 4  Stand on One Le  Total: 56/56         56=Maximum possible score;   0-20=High fall risk  21-40=Moderate fall risk   41-56=Low fall risk      Physical Therapy Evaluation Charge Determination   History Examination Presentation Decision-Making   LOW Complexity : Zero comorbidities / personal factors that will impact the outcome / POC HIGH Complexity : 4+ Standardized tests and measures addressing body structure, function, activity limitation and / or participation in recreation  LOW Complexity : Stable, uncomplicated  Other outcome measures stallworth  LOW       Based on the above components, the patient evaluation is determined to be of the following complexity level: LOW     Activity Tolerance:   Good    After treatment patient left in no apparent distress:   Supine in bed, Call bell within reach and Caregiver / family present    COMMUNICATION/EDUCATION:   The patients plan of care was discussed with: Occupational therapist, Speech therapist and Registered nurse. Fall prevention education was provided and the patient/caregiver indicated understanding., Patient/family have participated as able in goal setting and plan of care. and Patient/family agree to work toward stated goals and plan of care. Educated patient on BE FAST acronym for signs/symptoms of stroke and provided a handout.        Thank you for this referral.  Amelia Villegas, PT, DPT   Time Calculation: 13 mins

## 2020-11-10 NOTE — PROGRESS NOTES
OCCUPATIONAL THERAPY EVALUATION/DISCHARGE  Patient: Stu Manzano (17 y.o. male)  Date: 11/10/2020  Primary Diagnosis: CVA (cerebral vascular accident) [facility]) [I63.9]       Precautions: none       ASSESSMENT  Based on the objective data described below, the patient presents with mild expressive aphasia (per SLP) but otherwise neuro intact and functioning at independent level for ADLs and functional mobility. Demonstrates BUE AROM/ strength/ coordination equal and intact. Also with intact vision, sensation, and balance. Patient ambulated unit and successfully navigated cluttered environment, avoiding obstacles and picking up items on floor independently without cuing. Patient and wife were receptive to [facility] education on signs/ symptoms of CVA. Recommend d/c home when medically stable with no additional OT needs. Current Level of Function (ADLs/self-care): independent    Functional Outcome Measure: BUE intact with screening. Full Fugl Alonzo Pen not indicated. Scored 100/100 on Barthel Index. PLAN :  Recommendation for discharge: (in order for the patient to meet his/her long term goals)  No skilled occupational therapy/ follow up rehabilitation needs identified at this time.     This discharge recommendation:  Has not yet been discussed the attending provider and/or case management       SUBJECTIVE:   Patient stated It's getting better but not normal. (referring to speech)    OBJECTIVE DATA SUMMARY:   HISTORY:   Past Medical History:   Diagnosis Date    Asthma     High cholesterol     Hypertension     Peptic ulcer 2000    on med- no problems since    TGA (transient global amnesia) 12/22/2014    Vertigo Nov. 2012     Past Surgical History:   Procedure Laterality Date    ENDOSCOPY, COLON, DIAGNOSTIC  2006    neg    HX HERNIA REPAIR      HX ORTHOPAEDIC      left shoulder june 2011    HX TONSILLECTOMY         Prior Level of Function/Environment/Context: independent without AD  Expanded or extensive additional review of patient history:     Home Situation  Home Environment: Apartment  # Steps to Enter: 3  One/Two Story Residence: One story  Living Alone: No  Support Systems: Spouse/Significant Other/Partner, Family member(s)  Patient Expects to be Discharged to[de-identified] Private residence  Current DME Used/Available at Home: None    EXAMINATION OF PERFORMANCE DEFICITS:  Cognitive/Behavioral Status:  Neurologic State: Alert  Orientation Level: Oriented X4  Cognition: Follows commands     Perseveration: No perseveration noted       Skin: visible skin appears intact    Edema: none noted    Hearing: Auditory  Auditory Impairment: None    Vision/Perceptual:    Tracking: Able to track stimulus in all quadrants w/o difficulty              Visual Fields: (able to detect stimuli in all fields)  Diplopia: No    Acuity: Within Defined Limits         Range of Motion:    AROM: Within functional limits  PROM: Within functional limits                      Strength:    Strength: Within functional limits                Coordination:  Coordination: Within functional limits  Fine Motor Skills-Upper: Left Intact; Right Intact    Gross Motor Skills-Upper: Left Intact; Right Intact    Tone & Sensation:    Tone: Normal  Sensation: Intact                      Balance:  Sitting: Intact  Standing: Intact    Functional Mobility and Transfers for ADLs:  Bed Mobility:  Supine to Sit: Independent  Sit to Supine: Independent  Scooting: Independent    Transfers:  Sit to Stand: Independent  Stand to Sit: Independent    ADL Assessment:  Feeding: Independent(inferred)    Oral Facial Hygiene/Grooming: Independent(inferred)    Bathing: Independent(inferred)    Upper Body Dressing: Independent(inferred)    Lower Body Dressing: Independent(inferred)    Toileting: Independent(inferred)      Functional Measure:  Barthel Index:    Bathin  Bladder: 10  Bowels: 10  Groomin  Dressing: 10  Feeding: 10  Mobility: 15  Stairs: 10  Toilet Use: 10  Transfer (Bed to Chair and Back): 15  Total: 100/100        The Barthel ADL Index: Guidelines  1. The index should be used as a record of what a patient does, not as a record of what a patient could do. 2. The main aim is to establish degree of independence from any help, physical or verbal, however minor and for whatever reason. 3. The need for supervision renders the patient not independent. 4. A patient's performance should be established using the best available evidence. Asking the patient, friends/relatives and nurses are the usual sources, but direct observation and common sense are also important. However direct testing is not needed. 5. Usually the patient's performance over the preceding 24-48 hours is important, but occasionally longer periods will be relevant. 6. Middle categories imply that the patient supplies over 50 per cent of the effort. 7. Use of aids to be independent is allowed. Wanda Andrea., Barthel, D.W. (0854). Functional evaluation: the Barthel Index. [facility] (14)2. Marily Peacock cindy COLLEEN Nicole, Pineda Reilly., [facility], [address] [place] [address] (1999). Measuring the change indisability after inpatient rehabilitation; comparison of the responsiveness of the Barthel Index and Functional Tompkins Measure. Journal of Neurology, Neurosurgery, and Psychiatry, 66(4), 373-733. Jordan Yi, N.J.A, BURKE Sneed, & Marly Bennett M.A. (2004.) Assessment of post-stroke quality of life in cost-effectiveness studies: The usefulness of the Barthel Index and the EuroQoL-5D.  Quality of Life Research, 15, 039-97         Occupational Therapy Evaluation Charge Determination   History Examination Decision-Making   LOW Complexity : Brief history review  LOW Complexity : 1-3 performance deficits relating to physical, cognitive , or psychosocial skils that result in activity limitations and / or participation restrictions  LOW Complexity : No comorbidities that affect functional and no verbal or physical assistance needed to complete eval tasks       Based on the above components, the patient evaluation is determined to be of the following complexity level: LOW   Pain Rating:  Patient did not report pain    Activity Tolerance:   VSS, good    After treatment patient left in no apparent distress:    Supine in bed  Call bell left within reach  Wife present    COMMUNICATION/EDUCATION:   The patients plan of care was discussed with: Physical therapist, Speech therapist and Registered nurse. Patient was educated regarding his deficit(s) of mild aphasia as this relates to his diagnosis of CVA. He demonstrated Good understanding as evidenced by verbalization. Patient and/or family was verbally educated on the BE FAST acronym for signs/symptoms of CVA and TIA. Provided with BEFAST handout. All questions answered with patient indicating good understanding.      Thank you for this referral.  Radha Parks OT  Time Calculation: 14 mins

## 2020-11-10 NOTE — PROGRESS NOTES
Problem: Falls - Risk of  Goal: *Absence of Falls  Description: Document Live Peña Fall Risk and appropriate interventions in the flowsheet.   Outcome: Progressing Towards Goal  Note: Fall Risk Interventions:            Medication Interventions: Assess postural VS orthostatic hypotension, Patient to call before getting OOB, Teach patient to arise slowly                   Problem: TIA/CVA Stroke: Day 2 Until Discharge  Goal: Activity/Safety  Outcome: Progressing Towards Goal  Goal: Diagnostic Test/Procedures  Outcome: Progressing Towards Goal  Goal: Nutrition/Diet  Outcome: Progressing Towards Goal  Goal: Discharge Planning  Outcome: Progressing Towards Goal  Goal: Medications  Outcome: Progressing Towards Goal  Goal: Respiratory  Outcome: Progressing Towards Goal  Goal: Treatments/Interventions/Procedures  Outcome: Progressing Towards Goal  Goal: Psychosocial  Outcome: Progressing Towards Goal  Goal: *Absence of aspiration  Outcome: Progressing Towards Goal  Goal: *Absence of deep venous thrombosis signs and symptoms(Stroke Metric)  Outcome: Progressing Towards Goal  Goal: *Optimal pain control at patient's stated goal  Outcome: Progressing Towards Goal  Goal: *Tolerating diet  Outcome: Progressing Towards Goal  Goal: *Ability to perform ADLs and demonstrates progressive mobility and function  Outcome: Progressing Towards Goal  Goal: *Stroke education continued(Stroke Metric)  Outcome: Progressing Towards Goal

## 2020-11-10 NOTE — PROGRESS NOTES
Saeids Mayra Dan, & Khanh Bennett    Admit Date: 11/9/2020    Subjective:     Pt's speech seems normal now. No new complaints. MRI shows moderate acute infarction in the anterior superior left frontal lobe. Current Facility-Administered Medications   Medication Dose Route Frequency    albuterol (PROVENTIL VENTOLIN) nebulizer solution 2.5 mg  2.5 mg Nebulization QID PRN    aspirin delayed-release tablet 325 mg  325 mg Oral DAILY    famotidine (PEPCID) tablet 40 mg  40 mg Oral DAILY    ferrous sulfate tablet 325 mg  325 mg Oral ACB    budesonide (PULMICORT) 500 mcg/2 ml nebulizer suspension  500 mcg Nebulization BID    cetirizine (ZYRTEC) tablet 10 mg  10 mg Oral DAILY    montelukast (SINGULAIR) tablet 10 mg  10 mg Oral DAILY    atorvastatin (LIPITOR) tablet 80 mg  80 mg Oral DAILY    ondansetron (ZOFRAN) injection 4 mg  4 mg IntraVENous Q6H PRN    acetaminophen (TYLENOL) tablet 650 mg  650 mg Oral Q4H PRN    cloNIDine HCL (CATAPRES) tablet 0.1 mg  0.1 mg Oral Q2H PRN          Objective:     Patient Vitals for the past 8 hrs:   BP Temp Pulse Resp SpO2 Weight   11/10/20 0557 118/66 97.9 °F (36.6 °C) 84 24 96 %    11/10/20 0212 (!) 117/47 97.7 °F (36.5 °C) 70 15 96 % 165 lb 1.6 oz (74.9 kg)     No intake/output data recorded. No intake/output data recorded. Physical Exam: NAD. A&O. Speech seems normal now. Neck -- Supple. No JVD. Heart -- RRR. Lungs -- CTA. Abd -- Benign. Ext -- No LE edema, b/l.       Data Review   Recent Results (from the past 24 hour(s))   GLUCOSE, POC    Collection Time: 11/09/20  1:53 PM   Result Value Ref Range    Glucose (POC) 105 (H) 65 - 100 mg/dL    Performed by [address] [place] [address], COMPREHENSIVE    Collection Time: 11/09/20  2:27 PM   Result Value Ref Range    Sodium 135 (L) 136 - 145 mmol/L Potassium 3.8 3.5 - 5.1 mmol/L    Chloride 101 97 - 108 mmol/L    CO2 28 21 - 32 mmol/L    Anion gap 6 5 - 15 mmol/L    Glucose 103 (H) 65 - 100 mg/dL    BUN 26 (H) 6 - 20 MG/DL    Creatinine 1.29 0.70 - 1.30 MG/DL    BUN/Creatinine ratio 20 12 - 20      GFR est AA >60 >60 ml/min/1.73m2    GFR est non-AA 55 (L) >60 ml/min/1.73m2    Calcium 9.5 8.5 - 10.1 MG/DL    Bilirubin, total 0.6 0.2 - 1.0 MG/DL    ALT (SGPT) 33 12 - 78 U/L    AST (SGOT) 29 15 - 37 U/L    Alk. phosphatase 69 45 - 117 U/L    Protein, total 6.6 6.4 - 8.2 g/dL    Albumin 3.3 (L) 3.5 - 5.0 g/dL    Globulin 3.3 2.0 - 4.0 g/dL    A-G Ratio 1.0 (L) 1.1 - 2.2     CBC WITH AUTOMATED DIFF    Collection Time: 11/09/20  2:27 PM   Result Value Ref Range    WBC 8.5 4.1 - 11.1 K/uL    RBC 3.54 (L) 4.10 - 5.70 M/uL    HGB 11.7 (L) 12.1 - 17.0 g/dL    HCT 35.2 (L) 36.6 - 50.3 %    MCV 99.4 (H) 80.0 - 99.0 FL    MCH 33.1 26.0 - 34.0 PG    MCHC 33.2 30.0 - 36.5 g/dL    RDW 12.9 11.5 - 14.5 %    PLATELET 746 721 - 091 K/uL    MPV 9.5 8.9 - 12.9 FL    NRBC 0.0 0  WBC    ABSOLUTE NRBC 0.00 0.00 - 0.01 K/uL    NEUTROPHILS 74 32 - 75 %    LYMPHOCYTES 12 12 - 49 %    MONOCYTES 10 5 - 13 %    EOSINOPHILS 3 0 - 7 %    BASOPHILS 0 0 - 1 %    IMMATURE GRANULOCYTES 1 (H) 0.0 - 0.5 %    ABS. NEUTROPHILS 6.3 1.8 - 8.0 K/UL    ABS. LYMPHOCYTES 1.1 0.8 - 3.5 K/UL    ABS. MONOCYTES 0.9 0.0 - 1.0 K/UL    ABS. EOSINOPHILS 0.2 0.0 - 0.4 K/UL    ABS. BASOPHILS 0.0 0.0 - 0.1 K/UL    ABS. IMM. GRANS. 0.0 0.00 - 0.04 K/UL    DF AUTOMATED     SAMPLES BEING HELD    Collection Time: 11/09/20  2:27 PM   Result Value Ref Range    SAMPLES BEING HELD 1BLU,1RED     COMMENT        Add-on orders for these samples will be processed based on acceptable specimen integrity and analyte stability, which may vary by analyte.    EKG, 12 LEAD, INITIAL    Collection Time: 11/09/20  3:05 PM   Result Value Ref Range    Ventricular Rate 65 BPM    Atrial Rate 65 BPM    P-R Interval 152 ms    QRS Duration 92 ms    Q-T Interval 412 ms    QTC Calculation (Bezet) 428 ms    Calculated P Axis -11 degrees    Calculated R Axis 30 degrees    Calculated T Axis 24 degrees    Diagnosis       Normal sinus rhythm  When compared with ECG of 22-DEC-2014 14:17,  No significant change was found  Confirmed by Ney Alcala MD, Tierra Andersen ([id number]) on 11/9/2020 4:46:04 PM     CBC WITH AUTOMATED DIFF    Collection Time: 11/10/20  4:15 AM   Result Value Ref Range    WBC 8.3 4.1 - 11.1 K/uL    RBC 3.44 (L) 4.10 - 5.70 M/uL    HGB 11.3 (L) 12.1 - 17.0 g/dL    HCT 33.9 (L) 36.6 - 50.3 %    MCV 98.5 80.0 - 99.0 FL    MCH 32.8 26.0 - 34.0 PG    MCHC 33.3 30.0 - 36.5 g/dL    RDW 13.2 11.5 - 14.5 %    PLATELET 517 031 - 327 K/uL    MPV 9.7 8.9 - 12.9 FL    NRBC 0.0 0  WBC    ABSOLUTE NRBC 0.00 0.00 - 0.01 K/uL    NEUTROPHILS 75 32 - 75 %    LYMPHOCYTES 12 12 - 49 %    MONOCYTES 11 5 - 13 %    EOSINOPHILS 2 0 - 7 %    BASOPHILS 0 0 - 1 %    IMMATURE GRANULOCYTES 0 0.0 - 0.5 %    ABS. NEUTROPHILS 6.2 1.8 - 8.0 K/UL    ABS. LYMPHOCYTES 1.0 0.8 - 3.5 K/UL    ABS. MONOCYTES 0.9 0.0 - 1.0 K/UL    ABS. EOSINOPHILS 0.2 0.0 - 0.4 K/UL    ABS. BASOPHILS 0.0 0.0 - 0.1 K/UL    ABS. IMM.  GRANS. 0.0 0.00 - 0.04 K/UL    DF AUTOMATED     METABOLIC PANEL, BASIC    Collection Time: 11/10/20  4:15 AM   Result Value Ref Range    Sodium 138 136 - 145 mmol/L    Potassium 3.8 3.5 - 5.1 mmol/L    Chloride 108 97 - 108 mmol/L    CO2 25 21 - 32 mmol/L    Anion gap 5 5 - 15 mmol/L    Glucose 115 (H) 65 - 100 mg/dL    BUN 26 (H) 6 - 20 MG/DL    Creatinine 1.16 0.70 - 1.30 MG/DL    BUN/Creatinine ratio 22 (H) 12 - 20      GFR est AA >60 >60 ml/min/1.73m2    GFR est non-AA >60 >60 ml/min/1.73m2    Calcium 9.2 8.5 - 10.1 MG/DL   LIPID PANEL    Collection Time: 11/10/20  4:15 AM   Result Value Ref Range    LIPID PROFILE          Cholesterol, total 155 <200 MG/DL    Triglyceride 220 (H) <150 MG/DL    HDL Cholesterol 61 MG/DL    LDL, calculated 50 0 - 100 MG/DL    VLDL, calculated 44 MG/DL    CHOL/HDL Ratio 2.5 0.0 - 5.0             Assessment:     Principal Problem:    CVA (cerebral vascular accident) ([facility] [address] (11/9/2020)      Active Problems:    Hyperlipidemia (11/1/2011)      Essential hypertension (6/3/2015)        Plan:     1. Cont higher-dose ASA. ? change to Plavix? -- Will appreciate neurology input. 2. Carotid dopplers & ECHO today. 3. Continue higher-dose statin, though his lipid panel looked excellent already with 20 mg simvastatin. 4. Continue to hold losartan/HCTZ, allowing for permissive HTN right now (BP's been running low here anyway). 5. PT/ST/OT.       D/w wife, Moe Dumont -- Robert Garner MD

## 2020-11-10 NOTE — PROGRESS NOTES
Problem: Falls - Risk of  Goal: *Absence of Falls  Description: Document Doretha Downey Fall Risk and appropriate interventions in the flowsheet.   Outcome: Progressing Towards Goal  Note: Fall Risk Interventions:            Medication Interventions: Teach patient to arise slowly                   Problem: Patient Education: Go to Patient Education Activity  Goal: Patient/Family Education  Outcome: Progressing Towards Goal     Problem: Patient Education: Go to Patient Education Activity  Goal: Patient/Family Education  Outcome: Progressing Towards Goal     Problem: TIA/CVA Stroke: 0-24 hours  Goal: Off Pathway (Use only if patient is Off Pathway)  Outcome: Progressing Towards Goal  Goal: Activity/Safety  Outcome: Progressing Towards Goal  Goal: Consults, if ordered  Outcome: Progressing Towards Goal  Goal: Diagnostic Test/Procedures  Outcome: Progressing Towards Goal  Goal: Nutrition/Diet  Outcome: Progressing Towards Goal  Goal: Discharge Planning  Outcome: Progressing Towards Goal  Goal: Medications  Outcome: Progressing Towards Goal  Goal: Respiratory  Outcome: Progressing Towards Goal  Goal: Treatments/Interventions/Procedures  Outcome: Progressing Towards Goal  Goal: Minimize risk of bleeding post-thrombolytic infusion  Outcome: Progressing Towards Goal  Goal: Psychosocial  Outcome: Progressing Towards Goal  Goal: *Hemodynamically stable  Outcome: Progressing Towards Goal  Goal: *Absence of Signs of Aspiration on Current Diet  Outcome: Progressing Towards Goal  Goal: *Absence of deep venous thrombosis signs and symptoms(Stroke Metric)  Outcome: Progressing Towards Goal  Goal: *Ability to perform ADLs and demonstrates progressive mobility and function  Outcome: Progressing Towards Goal  Goal: *Stroke education started(Stroke Metric)  Outcome: Progressing Towards Goal  Goal: *Dysphagia screen performed(Stroke Metric)  Outcome: Progressing Towards Goal  Goal: *Rehab consulted(Stroke Metric)  Outcome: Progressing Towards Goal

## 2020-11-10 NOTE — PROGRESS NOTES
Occupational Therapy    Occupational therapy evaluation completed. Full documentation to follow. Recommend d/c home when medically stable with no additional OT needs.     Rex Wright, OTR/L

## 2020-11-10 NOTE — CONSULTS
Neurology Progress  Mindy Wilhelm NP      Date of admission: 2020    Patient: Gay Boland MRN: [id number]  SSN: [id number]    YOB: 1945  Age: 70 y.o. Sex: male      Chief complaint: Expressive aphasia    Subjective:     HPI: Gay Boland is a 70 y.o. male with pmh significant for HTN, hyperlipidemia, and an episode of transient global amnesia in . On the evening of 20, he noticed some trouble speaking. His wife says he just did not say much.  am, it was clear he was having some expressive aphasia, so he was brought to the ER where CT showed apparent L frontal-parietal acute ischemia. CTA H&N unremarkable except for mild fusiform dilatation distal right vertebral artery at the level of C2 that could represent chronic dissection. Teleneurology saw pt, and given timeframe pt not a tPA candidate nor deemed appropriate for neurointervention. Pt maintained on asa 81 mg, simvastatin 20 mg & losartan 100 / hctz 12.5. LDL 50. CUS without evidence of carotid artery stenosis; vertebrals are patent with antegrade flow. He reports that he is a non-smoker and has a couple of drinks a day. Interval 11/10/20:     ECHO read pending. A1C pending. Exam w/o focal deficits. Will check asa assay. Given healthy appearing vascular by imaging and carotid ultrasound, would like to assess for cardio-embolic source.     Past Medical History:   Diagnosis Date    Asthma     High cholesterol     Hypertension     Peptic ulcer     on med- no problems since    TGA (transient global amnesia) 2014    Vertigo 2012     Past Surgical History:   Procedure Laterality Date    ENDOSCOPY, COLON, DIAGNOSTIC      neg    HX HERNIA REPAIR      HX ORTHOPAEDIC      left shoulder 2011    HX TONSILLECTOMY        Family History   Problem Relation Age of Onset    Stroke Mother          CVA age 52's     Social History     Tobacco Use    Smoking status: Never Smoker    Smokeless tobacco: Never Used   Substance Use Topics    Alcohol use: Yes     Alcohol/week: 5.8 standard drinks     Types: 7 drink(s) per week      Prior to Admission medications    Medication Sig Start Date End Date Taking? Authorizing Provider   levocetirizine (XYZAL) 5 mg tablet Take 1 Tab by mouth daily. 10/26/20  Yes George Del Valle MD   simvastatin (ZOCOR) 20 mg tablet TAKE 1 TABLET DAILY 4/6/20  Yes George Del Valle MD   famotidine (PEPCID) 40 mg tablet TAKE 1 TABLET DAILY 4/6/20  Yes George Del Valle MD   fluticasone propionate (FLOVENT HFA) 110 mcg/actuation inhaler Take 2 Puffs by inhalation every twelve (12) hours. Rinse mouth after use 11/12/19  Yes Zara Brown PA   montelukast (SINGULAIR) 10 mg tablet Take 1 Tab by mouth daily. 11/12/19  Yes Marva Brown PA   losartan-hydroCHLOROthiazide (HYZAAR) 100-12.5 mg per tablet TAKE 1 TABLET DAILY 8/25/19  Yes George Del Valle MD   butalbital-acetaminophen-caffeine (FIORICET, ESGIC) -40 mg per tablet Take 1-2 Tabs by mouth every six (6) hours as needed for Pain. 1/31/17  Yes George Del Valle MD   albuterol (PROVENTIL HFA, VENTOLIN HFA, PROAIR HFA) 90 mcg/actuation inhaler Take 2 Puffs by inhalation four (4) times daily as needed for Wheezing. 1/31/17  Yes George Del Valle MD   vitamin c-vitamin e [facility] CONCENTRATE) cap Take 1 capsule by mouth daily. Yes Provider, Historical   cyanocobalamin (VITAMIN B12) 500 mcg tablet Take 500 mcg by mouth daily. Yes Other, MD Jose De Jesus   multivitamin (ONE A DAY) tablet Take 1 Tab by mouth daily. Yes [place], MD Jose De Jesus   aspirin delayed-release 81 mg tablet Take  by mouth daily. Yes Provider, Historical   ferrous sulfate (IRON) 325 mg (65 mg iron) tablet Take 325 mg by mouth daily (before breakfast).    Yes Provider, Historical     Current Facility-Administered Medications   Medication Dose Route Frequency Provider Last Rate Last Dose    albuterol (PROVENTIL VENTOLIN) nebulizer solution 2.5 mg  2.5 mg Nebulization QID PRN Shawanda Beltre MD   2.5 mg at 20    aspirin delayed-release tablet 325 mg  325 mg Oral DAILY Shawanda Beltre MD   325 mg at 11/10/20 0600    famotidine (PEPCID) tablet 40 mg  40 mg Oral DAILY Shawanda Beltre MD   40 mg at 11/10/20 4640    ferrous sulfate tablet 325 mg  325 mg Oral ACB Shawanda Beltre MD   325 mg at 11/10/20 0735    budesonide (PULMICORT) 500 mcg/2 ml nebulizer suspension  500 mcg Nebulization BID Shawanda Beltre MD   500 mcg at 20    cetirizine (ZYRTEC) tablet 10 mg  10 mg Oral DAILY Shawanda Beltre MD   10 mg at 11/10/20 7628    montelukast (SINGULAIR) tablet 10 mg  10 mg Oral DAILY Shawanda Beltre MD   10 mg at 11/10/20 2323    atorvastatin (LIPITOR) tablet 80 mg  80 mg Oral DAILY Shawanda Beltre MD   80 mg at 11/10/20 4177    ondansetron (ZOFRAN) injection 4 mg  4 mg IntraVENous Q6H PRN Shawanda Beltre MD        acetaminophen (TYLENOL) tablet 650 mg  650 mg Oral Q4H PRN Shawanda Beltre MD        cloNIDine HCL (CATAPRES) tablet 0.1 mg  0.1 mg Oral Q2H PRN Shawanda Beltre MD            Allergies   Allergen Reactions    Peanut Unknown (comments)       Review of systems  Comprehensive review of systems performed and negative except for as documented above. Objective:     Vitals:    11/10/20 0557 11/10/20 1000 11/10/20 1400 11/10/20 1452   BP: 118/66 130/77 122/72 122/72   Pulse: 84 66 65    Resp: 24 14 17    Temp: 97.9 °F (36.6 °C) 98 °F (36.7 °C) 98.2 °F (36.8 °C)    SpO2: 96% 98% 98%         Temp (24hrs), Av °F (36.7 °C), Min:97.6 °F (36.4 °C), Max:98.5 °F (36.9 °C)        O2 Device: Room air         Intake/Output Summary (Last 24 hours) at 11/10/2020 1600  Last data filed at 11/10/2020 1200  Gross per 24 hour   Intake 600 ml   Output    Net 600 ml       General: In NAD.    Cardiac: RRR  Lungs: Unlabored breathing. Abdomen: Soft/NT/non-distended. Extremities. No edema. Neurologic Exam:  Mental Status:  Alert and oriented x 4. Language:    Grossly intact fluency and comprehension. Repetition and naming intact. Cranial Nerves:   Pupils equal, round and reactive to light. Visual fields intact. Extraocular movements intact w/o nystagmus      Facial sensation intact to LT     Facial activation symmetric. Hearing intact bilaterally. No dysarthria. Shoulder shrug 5/5 bilaterally. Motor:    Bulk and tone normal.      5/5 strength in all extremities. No pronator drift. No involuntary movements. Sensation:    Sensation intact throughout to light touch. Coordination & Gait: No ataxia with finger to nose. Normal gait    LABS:  Recent Labs     11/10/20  0415 11/09/20  1427   WBC 8.3 8.5   HGB 11.3* 11.7*   HCT 33.9* 35.2*    175     Recent Labs     11/10/20  0415 11/09/20  1427    135*   K 3.8 3.8    101   CO2 25 28   BUN 26* 26*   CREA 1.16 1.29   * 103*   CA 9.2 9.5     Recent Labs     11/09/20  1427   ALT 33   AP 69   TBILI 0.6   TP 6.6   ALB 3.3*   GLOB 3.3     No results for input(s): INR, PTP, APTT, INREXT in the last 72 hours. No results for input(s): PHI, PCO2I, PO2I, HCO3I in the last 72 hours. No results for input(s): CPK, CKNDX, TROIQ in the last 72 hours.     No lab exists for component: CPKMB  Lab Results   Component Value Date/Time    Cholesterol, total 155 11/10/2020 04:15 AM    HDL Cholesterol 61 11/10/2020 04:15 AM    LDL, calculated 50 11/10/2020 04:15 AM    Triglyceride 220 (H) 11/10/2020 04:15 AM    CHOL/HDL Ratio 2.5 11/10/2020 04:15 AM     Lab Results   Component Value Date/Time    Glucose (POC) 105 (H) 11/09/2020 01:53 PM    Glucose (POC) 105 12/22/2014 02:20 PM     Lab Results   Component Value Date/Time    Color Yellow 07/15/2019 03:30 PM    Appearance Clear 07/15/2019 03:30 PM    Specific gravity 1.019 12/22/2014 02:35 PM    pH (UA) 5.5 07/15/2019 03:30 PM    Protein NEGATIVE  12/22/2014 02:35 PM    Glucose NEGATIVE  12/22/2014 02:35 PM    Ketone Negative 07/15/2019 03:30 PM    Bilirubin Negative 07/15/2019 03:30 PM    Urobilinogen 0.2 12/22/2014 02:35 PM    Nitrites Negative 07/15/2019 03:30 PM    Leukocyte Esterase Negative 07/15/2019 03:30 PM    Epithelial cells FEW 12/22/2014 02:35 PM    Bacteria Few 06/03/2015 09:00 AM    WBC 6-10 (A) 06/03/2015 09:00 AM    RBC 3-10 (A) 06/03/2015 09:00 AM       Recent Labs     11/10/20  0415   TIBC 342   PSAT 9*   FERR 316      Lab Results   Component Value Date/Time    Folate 16.3 08/02/2017 10:40 AM      No results for input(s): PH, PCO2, PO2 in the last 72 hours. No results for input(s): CPK, CKNDX, TROIQ in the last 72 hours. No lab exists for component: CPKMB    Imaging:  Mri Brain W Wo Cont    Result Date: 11/10/2020  IMPRESSION: Moderate acute infarction in the anterior superior left frontal lobe with no associated midline shift or hemorrhage. Mild chronic microvascular ischemic change. Paranasal sinus disease as described above. CT Results:  Results from Hospital Encounter encounter on 11/09/20   CTA CODE NEURO HEAD AND NECK W CONT    Narrative Clinical indication: Dysphagia. CTA of the head and neck obtained after intravenous injection 100 cc of  Isovue-370 with review of the raw data and MIP reconstructions. Comparison to  2013. CT dose reduction was achieved through the use of a standardized protocol  tailored for this examination and automatic exposure control for dose modulation  . Arturo Bees Head CT after intravenous contrast show no enhancing lesion. NASCET criteria. Origins of the innominate artery, left common carotid and left subclavian  arteries show no significant stenosis, the right vertebral is a dominant vessel. Origins of the vertebral show no stenosis.   The a carotid bifurcation shows some minimal calcifications, tortuosity but no  significant stenosis. The basilar artery shows no significant abnormalities. Intracranially. There is thinning without occlusion of a anterior M2 segment on  the left. There is occlusion of one of the M3 segment on the left corresponding  to the known left frontal infarct. Anterior and posterior circulation, right middle cerebral circulation show no  significant stenosis, filling defect. No definite evidence to suggest intracranial vascular malformation or aneurysm. Next      Impression IMPRESSION:    1. Abnormal distal  left middle cerebral artery distribution as above. CT CODE NEURO HEAD WO CONTRAST    Narrative EXAM: CT CODE NEURO HEAD WO CONTRAST    INDICATION: aphasia    COMPARISON: None. CONTRAST: None. TECHNIQUE: Unenhanced CT of the head was performed using 5 mm images. Brain and  bone windows were generated. Coronal and sagittal reformats. CT dose reduction  was achieved through use of a standardized protocol tailored for this  examination and automatic exposure control for dose modulation. FINDINGS:  Area of low density left frontoparietal likely ischemic and acute. Mild atrophy  and nonspecific white matter disease. No shift of the midline, hemorrhage or  extra-axial fluid collection. Impression  impression: Suspect acute ischemia left frontoparietal. No hemorrhage. Findings  called to Dr. Mj Lloyd   Results from [facility] Encounter encounter on 12/22/14   CT HEAD WO CONT    Narrative **Final Report**       ICD Codes / Adm. Diagnosis: 566196   / Altered mental status    Confusion/Speech Difficulty  Examination:  CT HEAD WO CON  - [id number] - Dec 22 2014  2:23PM  Accession No:  [id number]  Reason:  confusion      REPORT:  EXAM:  CT HEAD WO CON    INDICATION:   confusion    COMPARISON: 11.23.2013. TECHNIQUE: Unenhanced CT of the head was performed using 5 mm images. Brain   and bone windows were generated.      FINDINGS:  The ventricles and sulci are normal in size, shape and configuration and   midline. There is no significant white matter disease. There is no   intracranial hemorrhage, extra-axial collection, mass, mass effect or   midline shift. The basilar cisterns are open. No acute infarct is   identified. There is mucosal thickening in both maxillary sinuses. Medial   maxillary wall windows are present. IMPRESSION: No acute intracranial process seen              Signing/Reading Doctor: Ian Mckinney ([id number])    Approved: Ian Mckinney ([id number])  Dec 22 2014  2:25PM                                      MRI Results:  Results from Hospital Encounter encounter on 11/09/20   MRI BRAIN W WO CONT    Narrative Clinical history: CVA  INDICATION:   Left frontoparietal infarction is suspected. COMPARISON: CTA 11/9/2020  TECHNIQUE: MR examination of the brain includes axial and sagittal T1 , axial  T2, axial FLAIR, axial gradient echo, axial DWI, coronal T1 . Pre and post  contrast axial T1-weighted imaging. Postcontrast T1-weighted imaging coronal  plane. CONTRAST: The patient was administered gadolinium-based contrast material,  subsequently axial and sagittal T1-weighted postcontrast imaging was obtained. FINDINGS:   . There is a moderate sized area of acute cortical infarction in the superior left  frontal lobe anteriorly. No associated hemorrhage, midline shift or mass effect. Periventricular minimal scattered foci of increased T2 signal intensity and  corona radiata and central emboli. Left and right maxillary mucus retention  cyst.. Ethmoid and maxillary sinus disease as well. Right vertebral artery is  dominant. There is no The brain architecture is normal. There is no evidence of  midline shift or mass-effect. The ventricles are normal in size, position and  configuration. There are no extra-axial fluid collections. Major intracranial  vascular flow-voids are unremarkable. The orbits are grossly symmetric. Dural  venous sinuses are grossly unremarkable.  There is no Chiari or syrinx. Pituitary  and infundibulum grossly unremarkable. Cerebellopontine angles are unremarkable. No skull base mass is identified. Cavernous sinuses are symmetric. Impression IMPRESSION:  Moderate acute infarction in the anterior superior left frontal lobe with no  associated midline shift or hemorrhage. Mild chronic microvascular ischemic change. Paranasal sinus disease as described above. Results from [place] encounter on 12/22/14   MRI BRAIN W WO CONT    Narrative **Final Report**       ICD Codes / Adm. Diagnosis: 435.9  266366 / Unspecified transient cerebral    Altered mental status  Examination:  MR BRAIN W AND WO CON  - [id number] - Dec 23 2014 10:56AM  Accession No:  [id number]  Reason:        REPORT:  CLINICAL HISTORY: Weakness    INDICATION: Weakness,  allergy. COMPARISON: None    TECHNIQUE: MR examination of the brain includes axial and sagittal T1   pre-and-post contrast, axial T2, axial FLAIR, axial gradient echo, axial   DWI, coronal T1 postcontrast.    Contrast: The patient was administered 15 mL Magnevist contrast material   axial and coronal T1-weighted postcontrast enhanced imaging was obtained. FINDINGS:     There is no intracranial mass. There is no intracranial hemorrhage. There is   no evidence of acute infarction. Scattered abnormal foci of increased T2 signal intensity in the coronal   radiata and centrum semiovale. Confluent periventricular increased T2 signal   intensity. Opacification in the inferior aspect of the left maxillary sinus. The major intracranial vascular flow-voids are grossly unremarkable. Cerebellopontine angles grossly unremarkable. No skull base mass. There is no abnormal parenchymal enhancement. There is no abnormal meningeal   enhancement. The cavernous sinuses are symmetric. There is no Chiari. There   is no syrinx. Pituitary and infundibulum gross unremarkable.  The brain   architecture is normal. There is no evidence of midline shift or   mass-effect. The ventricles are normal in size, position and configuration. There are no extra-axial fluid collections. The mastoid air cells and paranasal sinuses are well pneumatized and clear. IMPRESSION:    No intracranial mass, hemorrhage or evidence of acute infarction. Findings consistent with minimal chronic microvascular ischemic change. Signing/Reading Doctor: Rubén Quinones ([id number])    Approved: Rubén Quinones ([id number])  Dec 23 2014 12:18PM                                      XR Results   Results from Hospital Encounter encounter on 02/15/15   XR ANKLE RT MIN 3 V   [facility]   XR CHEST PORTABLE       VAS/US Results (maximum last 3): Results from Hospital Encounter encounter on 12/22/14   DUPLEX CAROTID BILATERAL       TTE         EKG  Results for orders placed or performed during the hospital encounter of 11/09/20   EKG, 12 LEAD, INITIAL   Result Value Ref Range    Ventricular Rate 65 BPM    Atrial Rate 65 BPM    P-R Interval 152 ms    QRS Duration 92 ms    Q-T Interval 412 ms    QTC Calculation (Bezet) 428 ms    Calculated P Axis -11 degrees    Calculated R Axis 30 degrees    Calculated T Axis 24 degrees    Diagnosis       Normal sinus rhythm  When compared with ECG of 22-DEC-2014 14:17,  No significant change was found  Confirmed by Reinaldo Grover MD, Patricia Moran ([id number]) on 11/9/2020 4:46:04 PM         Hospital Problems  Date Reviewed: 10/26/2020          Codes Class Noted POA    * (Principal) CVA (cerebral vascular accident) [facility]) ICD-10-CM: I63.9  ICD-9-CM: 434.91  11/9/2020 Unknown        Essential hypertension ICD-10-CM: I10  ICD-9-CM: 401.9  6/3/2015 Yes        Hyperlipidemia ICD-10-CM: E78.5  ICD-9-CM: 272.4  11/1/2011 Yes              Assessment/Plan:     Clara Hui is a 70 y.o. male with pmh significant for HTN, hyperlipidemia, and an episode of transient global amnesia in 2014.  On the evening of 11/8/20, he noticed some trouble speaking and on 11/9 am, it was clear he was having some expressive aphasia, so he was brought to the ER where CT showed L frontal-parietal acute ischemia. CTA H&N unremarkable except for mild fusiform dilatation distal right vertebral artery at the level of C2 that could represent chronic dissection, however non-contributory in regards to this stroke. Teleneurology saw pt, and given timeframe pt not a tPA candidate nor deemed appropriate for neurointervention. Pt maintained on asa 81 mg, simvastatin 20 mg & losartan 100 / hctz 12.5. LDL 50. CUS without evidence of carotid artery stenosis; vertebrals are patent with antegrade flow. Given healthy appearing vasculature by imaging and carotid ultrasound, would like to assess for cardiac etiology. Exam w/o focal deficits. Cardiology consult to eval for thrombogenic arrhythmia and assess for 30 day monitoring  ECHO read pending. A1C pending. Will check asa assay. Final Recs to follow from Dr. Paulino Barraza    Thank you for this consult.     Signed By: Orlando Solomon NP     November 10, 2020 4:00 PM

## 2020-11-10 NOTE — PROGRESS NOTES
Bedside shift change report given to Virgie Sterling RN (oncoming nurse) by Leellen Litten, RN (offgoing nurse). Report included the following information SBAR, Kardex, MAR, Accordion, Recent Results, Cardiac Rhythm NSR and Dual Neuro Assessment.

## 2020-11-10 NOTE — PROGRESS NOTES
Problem: Communication Impaired (Adult)  Goal: *Acute Goals and Plan of Care (Insert Text)  Description: Initiated 11/10/2020  1. Patient will complete complex word retrieval tasks with 80% accuracy within 7 days. Outcome: Not Met   SPEECH LANGUAGE PATHOLOGY EVALUATION  Patient: Andres Damian (73 y.o. male)  Date: 11/10/2020  Primary Diagnosis: CVA (cerebral vascular accident) [facility]) [I63.9]        Precautions: fall       ASSESSMENT :  Based on the objective data described below, the patient presents with mild expressive aphasia characterized by fluent speech with intermittent word retrieval deficits. He shows most significant difficulty completing complex word retrieval tasks, with naming skills fairly intact. He and his wife report speech is approaching baseline, though not yet there. Patient will benefit from skilled intervention to address the above impairments. Patients rehabilitation potential is considered to be Good     PLAN :  Recommendations and Planned Interventions:  SLP to follow for language treatment and education regarding compensatory strategies. Frequency/Duration: Patient will be followed by speech-language pathology 3 times a week to address goals. Discharge Recommendations: Outpatient     SUBJECTIVE:   Patient stated I'm sorry, I can't think of any more.     OBJECTIVE:     Past Medical History:   Diagnosis Date    Asthma     High cholesterol     Hypertension     Peptic ulcer 2000    on med- no problems since    TGA (transient global amnesia) 12/22/2014    Vertigo Nov. 2012     Past Surgical History:   Procedure Laterality Date    ENDOSCOPY, COLON, DIAGNOSTIC  2006    neg    HX HERNIA REPAIR      HX ORTHOPAEDIC      left shoulder june 2011    HX TONSILLECTOMY       Prior Level of Function/Home Situation:    Home Situation  Home Environment: Private residence  One/Two Story Residence: Two story, live on 1st floor  Living Alone: No  Support Systems: Family member(s), Friends \ neighbors  Patient Expects to be Discharged to[de-identified] Private residence  Current DME Used/Available at Home: None  Mental Status:  Neurologic State: Alert  Orientation Level: Oriented X4  Cognition: Follows commands     Perseveration: No perseveration noted          Language Comprehension and Expression:     Verbal Expression  Initiation: No impairment  Naming: No impairment 100%  Sentence Completion: No impairment 100%  Conversation: Fluent  Speech Characteristics: Word retrieval  Word fluency, able to name 6 items in 1 category in 1 minute (animals), 2 items in another (words with /r/)   Word retrieval deficits at the conversational level, unable to describe the word of provide any other relevant information to compensate for the word retrieval deficit. NOMS: 5 for expression    Pain:  Pain Scale 1: Numeric (0 - 10)  Pain Intensity 1: 0       After treatment:   Patient left in no apparent distress in bed, Call bell within reach, Nursing notified, and Caregiver / family present    COMMUNICATION/EDUCATION:   Patient was educated regarding his deficit(s) of high level word retrieval impairment and recommendations for follow up therapy. The patient's plan of care including recommendations, planned interventions, and recommended diet changes were discussed with: Physical therapist, Occupational therapist, and Registered nurse. Patient/family have participated as able in goal setting and plan of care.     Thank you for this referral.  KRISTOPHER Pedersen  Time Calculation: 12 mins

## 2020-11-11 ENCOUNTER — HOME HEALTH ADMISSION (OUTPATIENT)
Dept: HOME HEALTH SERVICES | Facility: HOME HEALTH | Age: 75
End: 2020-11-11
Payer: COMMERCIAL

## 2020-11-11 ENCOUNTER — APPOINTMENT (OUTPATIENT)
Dept: NON INVASIVE DIAGNOSTICS | Age: 75
DRG: 066 | End: 2020-11-11
Attending: INTERNAL MEDICINE
Payer: COMMERCIAL

## 2020-11-11 VITALS
OXYGEN SATURATION: 97 % | HEART RATE: 65 BPM | HEIGHT: 70 IN | RESPIRATION RATE: 16 BRPM | DIASTOLIC BLOOD PRESSURE: 78 MMHG | SYSTOLIC BLOOD PRESSURE: 133 MMHG | BODY MASS INDEX: 23.41 KG/M2 | TEMPERATURE: 98.3 F | WEIGHT: 163.5 LBS

## 2020-11-11 LAB
ASPIRIN TEST, ASPIRN: 573 ARU
COMMENT, HOLDF: NORMAL
ECHO AV PEAK GRADIENT: 7.84 MMHG
ECHO AV PEAK VELOCITY: 140.04 CM/S
ECHO EST RA PRESSURE: 3 MMHG
ECHO LA AREA 4C: 21.17 CM2
ECHO LA MAJOR AXIS: 3.95 CM
ECHO LA MINOR AXIS: 2.06 CM
ECHO LA TO AORTIC ROOT RATIO: 1.14
ECHO LA VOL 4C: 59.88 ML (ref 18–58)
ECHO LA VOLUME INDEX A4C: 31.25 ML/M2 (ref 16–28)
ECHO LV INTERNAL DIMENSION DIASTOLIC: 4.41 CM (ref 4.2–5.9)
ECHO LV INTERNAL DIMENSION SYSTOLIC: 2.96 CM
ECHO LV IVSD: 0.94 CM (ref 0.6–1)
ECHO LV MASS 2D: 133.3 G (ref 88–224)
ECHO LV MASS INDEX 2D: 69.6 G/M2 (ref 49–115)
ECHO LV POSTERIOR WALL DIASTOLIC: 0.91 CM (ref 0.6–1)
ECHO LVOT PEAK GRADIENT: 5.73 MMHG
ECHO LVOT PEAK VELOCITY: 119.74 CM/S
ECHO MV A VELOCITY: 66.03 CM/S
ECHO MV E VELOCITY: 57.84 CM/S
ECHO MV E/A RATIO: 0.88
ECHO RIGHT VENTRICULAR SYSTOLIC PRESSURE (RVSP): 32.73 MMHG
ECHO RV INTERNAL DIMENSION: 4.27 CM
ECHO RV TAPSE: 2.17 CM (ref 1.5–2)
ECHO TV REGURGITANT MAX VELOCITY: 272.63 CM/S
ECHO TV REGURGITANT PEAK GRADIENT: 29.73 MMHG
EST. AVERAGE GLUCOSE BLD GHB EST-MCNC: 108 MG/DL
HBA1C MFR BLD: 5.4 % (ref 4–5.6)
P2Y12 PLT RESPONSE,PPPR: 121 PRU (ref 194–418)
P2Y12 PLT RESPONSE,PPPR: 223 PRU (ref 194–418)
SAMPLES BEING HELD,HOLD: NORMAL

## 2020-11-11 PROCEDURE — 85576 BLOOD PLATELET AGGREGATION: CPT

## 2020-11-11 PROCEDURE — 74011000250 HC RX REV CODE- 250: Performed by: INTERNAL MEDICINE

## 2020-11-11 PROCEDURE — 83036 HEMOGLOBIN GLYCOSYLATED A1C: CPT

## 2020-11-11 PROCEDURE — 99233 SBSQ HOSP IP/OBS HIGH 50: CPT | Performed by: NURSE PRACTITIONER

## 2020-11-11 PROCEDURE — 74011250637 HC RX REV CODE- 250/637: Performed by: NURSE PRACTITIONER

## 2020-11-11 PROCEDURE — 94640 AIRWAY INHALATION TREATMENT: CPT

## 2020-11-11 PROCEDURE — 74011250637 HC RX REV CODE- 250/637: Performed by: INTERNAL MEDICINE

## 2020-11-11 PROCEDURE — 93306 TTE W/DOPPLER COMPLETE: CPT | Performed by: SPECIALIST

## 2020-11-11 PROCEDURE — 93306 TTE W/DOPPLER COMPLETE: CPT

## 2020-11-11 PROCEDURE — 99223 1ST HOSP IP/OBS HIGH 75: CPT | Performed by: INTERNAL MEDICINE

## 2020-11-11 PROCEDURE — 36415 COLL VENOUS BLD VENIPUNCTURE: CPT

## 2020-11-11 RX ORDER — CLOPIDOGREL BISULFATE 75 MG/1
75 TABLET ORAL DAILY
Qty: 90 TAB | Refills: 3 | Status: SHIPPED | OUTPATIENT
Start: 2020-11-11 | End: 2020-12-02 | Stop reason: SDUPTHER

## 2020-11-11 RX ORDER — CLOPIDOGREL BISULFATE 75 MG/1
75 TABLET ORAL DAILY
Status: DISCONTINUED | OUTPATIENT
Start: 2020-11-12 | End: 2020-11-11 | Stop reason: HOSPADM

## 2020-11-11 RX ORDER — BUDESONIDE 0.5 MG/2ML
500 INHALANT ORAL
Status: DISCONTINUED | OUTPATIENT
Start: 2020-11-11 | End: 2020-11-11 | Stop reason: HOSPADM

## 2020-11-11 RX ORDER — ATORVASTATIN CALCIUM 40 MG/1
40 TABLET, FILM COATED ORAL DAILY
Qty: 90 TAB | Refills: 3 | Status: SHIPPED | OUTPATIENT
Start: 2020-11-11 | End: 2020-12-02 | Stop reason: SDUPTHER

## 2020-11-11 RX ORDER — CLOPIDOGREL BISULFATE 75 MG/1
300 TABLET ORAL ONCE
Status: COMPLETED | OUTPATIENT
Start: 2020-11-11 | End: 2020-11-11

## 2020-11-11 RX ADMIN — CLOPIDOGREL BISULFATE 300 MG: 75 TABLET ORAL at 08:52

## 2020-11-11 RX ADMIN — FAMOTIDINE 40 MG: 20 TABLET ORAL at 08:54

## 2020-11-11 RX ADMIN — ATORVASTATIN CALCIUM 40 MG: 40 TABLET, FILM COATED ORAL at 08:53

## 2020-11-11 RX ADMIN — CETIRIZINE HYDROCHLORIDE 10 MG: 10 TABLET, FILM COATED ORAL at 08:54

## 2020-11-11 RX ADMIN — BUDESONIDE 500 MCG: 0.5 INHALANT RESPIRATORY (INHALATION) at 07:47

## 2020-11-11 RX ADMIN — MONTELUKAST 10 MG: 10 TABLET, FILM COATED ORAL at 08:53

## 2020-11-11 RX ADMIN — BUDESONIDE 500 MCG: 0.5 INHALANT RESPIRATORY (INHALATION) at 08:00

## 2020-11-11 RX ADMIN — FERROUS SULFATE TAB 325 MG (65 MG ELEMENTAL FE) 325 MG: 325 (65 FE) TAB at 06:46

## 2020-11-11 NOTE — PROGRESS NOTES
Neurology  Given that pt had stroke while on ASA, albeit 81 mg dose, will add clopidogrel and check assays of both in the morning.         Kaushal Obando NP  11/10/20  7:57 PM

## 2020-11-11 NOTE — PROGRESS NOTES
[facility]- Home with [facility]  DEMARIO received a call from Mya Mora with [facility] - INPATIENT. She stated that she can accept this pt for home health.  DEMARIO placed this information on pt's AVS. Tami Jung

## 2020-11-11 NOTE — PROGRESS NOTES
Neurology Progress  Carlos Asher NP      Date of admission: 11/9/2020    Patient: Lesly Aguilar MRN: [id number]  SSN: [id number]    YOB: 1945  Age: 70 y.o. Sex: male      Chief complaint: Expressive aphasia    Subjective:     HPI: Lesly Aguilar is a 70 y.o. male with pmh significant for HTN, hyperlipidemia, and an episode of transient global amnesia in 2014. On the evening of 11/8/20, he noticed some trouble speaking. His wife says he just did not say much. 11/9 am, it was clear he was having some expressive aphasia, so he was brought to the ER where CT showed apparent L frontal-parietal acute ischemia. CTA H&N unremarkable except for mild fusiform dilatation distal right vertebral artery at the level of C2 that could represent chronic dissection, though not related to current infarct. Teleneurology saw pt, and given timeframe pt not a tPA candidate nor deemed appropriate for neurointervention. Pt maintained on asa 81 mg, simvastatin 20 mg & losartan 100 / hctz 12.5. LDL 50. CUS without evidence of carotid artery stenosis; vertebrals are patent with antegrade flow. He reports that he is a non-smoker and has a couple of drinks a day. Interval 11/11/20:     ECHO w/ LVEF is 55 - 60%. No patent foramen ovale visualized. The rest of ECHO unremarkable. A1C 5.4. Non-responder to ASA by assay. ASA discontinued. Reloaded w/ Plavix and now therapeutic. Given quite healthy appearing vascular by imaging and carotid ultrasound, cardiology consulted to eval for thrombogenic arrhythmia. Cardiology agreed to set pt up for 30 day monitoring.     Past Medical History:   Diagnosis Date    Asthma     High cholesterol     Hypertension     Peptic ulcer 2000    on med- no problems since    TGA (transient global amnesia) 12/22/2014    Vertigo Nov. 2012     Past Surgical History:   Procedure Laterality Date    ENDOSCOPY, COLON, DIAGNOSTIC  2006    neg    HX HERNIA REPAIR      HX ORTHOPAEDIC left shoulder 2011    HX TONSILLECTOMY        Family History   Problem Relation Age of Onset    Stroke Mother          CVA age 52's     Social History     Tobacco Use    Smoking status: Never Smoker    Smokeless tobacco: Never Used   Substance Use Topics    Alcohol use: Yes     Alcohol/week: 5.8 standard drinks     Types: 7 drink(s) per week      Prior to Admission medications    Medication Sig Start Date End Date Taking? Authorizing Provider   atorvastatin (LIPITOR) 40 mg tablet Take 1 Tab by mouth daily. 20  Yes Gaviota Alan MD   clopidogreL (Plavix) 75 mg tab Take 1 Tab by mouth daily. 20  Yes Gaviota Alan MD   levocetirizine (XYZAL) 5 mg tablet Take 1 Tab by mouth daily. 10/26/20  Yes Gaviota Alan MD   simvastatin (ZOCOR) 20 mg tablet TAKE 1 TABLET DAILY 20  Yes Gaviota Alan MD   famotidine (PEPCID) 40 mg tablet TAKE 1 TABLET DAILY 20  Yes Gaviota Alan MD   fluticasone propionate (FLOVENT HFA) 110 mcg/actuation inhaler Take 2 Puffs by inhalation every twelve (12) hours. Rinse mouth after use 19  Yes Zara Brown PA   montelukast (SINGULAIR) 10 mg tablet Take 1 Tab by mouth daily. 19  Yes Vanessa Brown PA   losartan-hydroCHLOROthiazide (HYZAAR) 100-12.5 mg per tablet TAKE 1 TABLET DAILY 19  Yes Gaviota Alan MD   butalbital-acetaminophen-caffeine (FIORICET, ESGIC) -40 mg per tablet Take 1-2 Tabs by mouth every six (6) hours as needed for Pain. 17  Yes Gaviota Alan MD   albuterol (PROVENTIL HFA, VENTOLIN HFA, PROAIR HFA) 90 mcg/actuation inhaler Take 2 Puffs by inhalation four (4) times daily as needed for Wheezing. 17  Yes Gaviota Alan MD   vitamin c-vitamin e [facility] CONCENTRATE) cap Take 1 capsule by mouth daily. Yes Provider, Historical   cyanocobalamin (VITAMIN B12) 500 mcg tablet Take 500 mcg by mouth daily.    Yes Other, MD Jose De Jesus   multivitamin (ONE A DAY) tablet Take 1 Tab by mouth daily. Yes [place], MD Jose De Jesus   aspirin delayed-release 81 mg tablet Take  by mouth daily. Yes Provider, Historical   ferrous sulfate (IRON) 325 mg (65 mg iron) tablet Take 325 mg by mouth daily (before breakfast). Yes Provider, Historical     Current Facility-Administered Medications   Medication Dose Route Frequency Provider Last Rate Last Dose    clopidogreL (PLAVIX) tablet 300 mg  300 mg Oral ONCE HUGH Barahona [place] ON 11/12/2020] clopidogreL (PLAVIX) tablet 75 mg  75 mg Oral DAILY Rafa García NP        atorvastatin (LIPITOR) tablet 40 mg  40 mg Oral DAILY Rafa García NP        albuterol (PROVENTIL VENTOLIN) nebulizer solution 2.5 mg  2.5 mg Nebulization QID PRN Josh Leahy MD   2.5 mg at 11/09/20 2024    famotidine (PEPCID) tablet 40 mg  40 mg Oral DAILY Josh Leahy MD   40 mg at 11/10/20 2058    ferrous sulfate tablet 325 mg  325 mg Oral ACB Josh Leahy MD   325 mg at 11/11/20 0646    budesonide (PULMICORT) 500 mcg/2 ml nebulizer suspension  500 mcg Nebulization BID Josh Leahy MD   500 mcg at 11/11/20 0800    cetirizine (ZYRTEC) tablet 10 mg  10 mg Oral DAILY Josh Leahy MD   10 mg at 11/10/20 4949    montelukast (SINGULAIR) tablet 10 mg  10 mg Oral DAILY Josh Leahy MD   10 mg at 11/10/20 5838    ondansetron (ZOFRAN) injection 4 mg  4 mg IntraVENous Q6H PRN Josh Leahy MD        acetaminophen (TYLENOL) tablet 650 mg  650 mg Oral Q4H PRN Josh Leahy MD        cloNIDine HCL (CATAPRES) tablet 0.1 mg  0.1 mg Oral Q2H PRN Josh Leahy MD            Allergies   Allergen Reactions    Peanut Unknown (comments)       Review of systems  Comprehensive review of systems performed and negative except for as documented above.      Objective:     Vitals:    11/10/20 2220 11/11/20 0200 11/11/20 0600 20 0747   BP: 124/65 119/70 121/76    Pulse: 64 61 70    Resp: 15 18 16    Temp: 98.5 °F (36.9 °C) 98.2 °F (36.8 °C) 98 °F (36.7 °C)    SpO2: 95% 98% 100% 94%        Temp (24hrs), Av.2 °F (36.8 °C), Min:98 °F (36.7 °C), Max:98.5 °F (36.9 °C)        O2 Device: Room air         Intake/Output Summary (Last 24 hours) at 2020 08  Last data filed at 11/10/2020 1600  Gross per 24 hour   Intake 580 ml   Output    Net 580 ml       General: In NAD. Cardiac: RRR  Lungs: Unlabored breathing. Abdomen: Soft/NT/non-distended. Extremities. No edema. Neurologic Exam:  Mental Status:  Alert and oriented x 4. Language:    Grossly intact fluency and comprehension. Repetition and naming intact. Cranial Nerves:   Pupils equal, round and reactive to light. Visual fields intact. Extraocular movements intact w/o nystagmus      Facial sensation intact to LT     Facial activation symmetric. Hearing intact bilaterally. No dysarthria. Shoulder shrug 5/5 bilaterally. Motor:    Bulk and tone normal.      5/5 strength in all extremities. No pronator drift. No involuntary movements. Sensation:    Sensation intact throughout to light touch. Coordination & Gait: No ataxia with finger to nose. Normal gait    LABS:  Recent Labs     11/10/20  0415 20  1427   WBC 8.3 8.5   HGB 11.3* 11.7*   HCT 33.9* 35.2*    175     Recent Labs     11/10/20  0415 20  1427    135*   K 3.8 3.8    101   CO2 25 28   BUN 26* 26*   CREA 1.16 1.29   * 103*   CA 9.2 9.5     Recent Labs     20  1427   ALT 33   AP 69   TBILI 0.6   TP 6.6   ALB 3.3*   GLOB 3.3     No results for input(s): INR, PTP, APTT, INREXT, INREXT in the last 72 hours. No results for input(s): PHI, PCO2I, PO2I, HCO3I in the last 72 hours. No results for input(s): CPK, CKNDX, TROIQ in the last 72 hours.     No lab exists for component: CPKMB  Lab Results   Component Value Date/Time Cholesterol, total 155 11/10/2020 04:15 AM    HDL Cholesterol 61 11/10/2020 04:15 AM    LDL, calculated 50 11/10/2020 04:15 AM    Triglyceride 220 (H) 11/10/2020 04:15 AM    CHOL/HDL Ratio 2.5 11/10/2020 04:15 AM     Lab Results   Component Value Date/Time    Glucose (POC) 105 (H) 11/09/2020 01:53 PM    Glucose (POC) 105 12/22/2014 02:20 PM     Lab Results   Component Value Date/Time    Color Yellow 07/15/2019 03:30 PM    Appearance Clear 07/15/2019 03:30 PM    Specific gravity 1.019 12/22/2014 02:35 PM    pH (UA) 5.5 07/15/2019 03:30 PM    Protein NEGATIVE  12/22/2014 02:35 PM    Glucose NEGATIVE  12/22/2014 02:35 PM    Ketone Negative 07/15/2019 03:30 PM    Bilirubin Negative 07/15/2019 03:30 PM    Urobilinogen 0.2 12/22/2014 02:35 PM    Nitrites Negative 07/15/2019 03:30 PM    Leukocyte Esterase Negative 07/15/2019 03:30 PM    Epithelial cells FEW 12/22/2014 02:35 PM    Bacteria Few 06/03/2015 09:00 AM    WBC 6-10 (A) 06/03/2015 09:00 AM    RBC 3-10 (A) 06/03/2015 09:00 AM       Recent Labs     11/10/20  0415   TIBC 342   PSAT 9*   FERR 316      Lab Results   Component Value Date/Time    Folate 16.3 08/02/2017 10:40 AM      No results for input(s): PH, PCO2, PO2 in the last 72 hours. No results for input(s): CPK, CKNDX, TROIQ in the last 72 hours. No lab exists for component: CPKMB    Imaging:  No results found. CT Results:  Results from Hospital Encounter encounter on 11/09/20   CTA CODE NEURO HEAD AND NECK W CONT    Narrative Clinical indication: Dysphagia. CTA of the head and neck obtained after intravenous injection 100 cc of  Isovue-370 with review of the raw data and MIP reconstructions. Comparison to  2013. CT dose reduction was achieved through the use of a standardized protocol  tailored for this examination and automatic exposure control for dose modulation  . [facility] Head CT after intravenous contrast show no enhancing lesion. NASCET criteria.     Origins of the innominate artery, left common carotid and left subclavian  arteries show no significant stenosis, the right vertebral is a dominant vessel. Origins of the vertebral show no stenosis. The a carotid bifurcation shows some minimal calcifications, tortuosity but no  significant stenosis. The basilar artery shows no significant abnormalities. Intracranially. There is thinning without occlusion of a anterior M2 segment on  the left. There is occlusion of one of the M3 segment on the left corresponding  to the known left frontal infarct. Anterior and posterior circulation, right middle cerebral circulation show no  significant stenosis, filling defect. No definite evidence to suggest intracranial vascular malformation or aneurysm. Next      Impression IMPRESSION:    1. Abnormal distal  left middle cerebral artery distribution as above. CT CODE NEURO HEAD WO CONTRAST    Narrative EXAM: CT CODE NEURO HEAD WO CONTRAST    INDICATION: aphasia    COMPARISON: None. CONTRAST: None. TECHNIQUE: Unenhanced CT of the head was performed using 5 mm images. Brain and  bone windows were generated. Coronal and sagittal reformats. CT dose reduction  was achieved through use of a standardized protocol tailored for this  examination and automatic exposure control for dose modulation. FINDINGS:  Area of low density left frontoparietal likely ischemic and acute. Mild atrophy  and nonspecific white matter disease. No shift of the midline, hemorrhage or  extra-axial fluid collection. Impression  impression: Suspect acute ischemia left frontoparietal. No hemorrhage. Findings  called to Dr. Carlee Bazna   Results from [facility] Encounter encounter on 12/22/14   CT HEAD WO CONT    Narrative **Final Report**       ICD Codes / Adm. Diagnosis: 317742   / Altered mental status    Confusion/Speech Difficulty  Examination:  CT HEAD WO CON  - [id number] - Dec 22 2014  2:23PM  Accession No:  [id number]  Reason:  confusion      REPORT:  EXAM:  CT HEAD WO CON    INDICATION:   confusion    COMPARISON: 11.23.2013. TECHNIQUE: Unenhanced CT of the head was performed using 5 mm images. Brain   and bone windows were generated. FINDINGS:  The ventricles and sulci are normal in size, shape and configuration and   midline. There is no significant white matter disease. There is no   intracranial hemorrhage, extra-axial collection, mass, mass effect or   midline shift. The basilar cisterns are open. No acute infarct is   identified. There is mucosal thickening in both maxillary sinuses. Medial   maxillary wall windows are present. IMPRESSION: No acute intracranial process seen              Signing/Reading Doctor: Tisha Barceans ([id number])    Approved: Tisha Barcenas ([id number])  Dec 22 2014  2:25PM                                      MRI Results:  Results from Hospital Encounter encounter on 11/09/20   MRI BRAIN W WO CONT    Narrative Clinical history: CVA  INDICATION:   Left frontoparietal infarction is suspected. COMPARISON: CTA 11/9/2020  TECHNIQUE: MR examination of the brain includes axial and sagittal T1 , axial  T2, axial FLAIR, axial gradient echo, axial DWI, coronal T1 . Pre and post  contrast axial T1-weighted imaging. Postcontrast T1-weighted imaging coronal  plane. CONTRAST: The patient was administered gadolinium-based contrast material,  subsequently axial and sagittal T1-weighted postcontrast imaging was obtained. FINDINGS:   . There is a moderate sized area of acute cortical infarction in the superior left  frontal lobe anteriorly. No associated hemorrhage, midline shift or mass effect. Periventricular minimal scattered foci of increased T2 signal intensity and  corona radiata and central emboli. Left and right maxillary mucus retention  cyst.. Ethmoid and maxillary sinus disease as well. Right vertebral artery is  dominant. There is no The brain architecture is normal. There is no evidence of  midline shift or mass-effect.  The ventricles are normal in size, position and  configuration. There are no extra-axial fluid collections. Major intracranial  vascular flow-voids are unremarkable. The orbits are grossly symmetric. Dural  venous sinuses are grossly unremarkable. There is no Chiari or syrinx. Pituitary  and infundibulum grossly unremarkable. Cerebellopontine angles are unremarkable. No skull base mass is identified. Cavernous sinuses are symmetric. Impression IMPRESSION:  Moderate acute infarction in the anterior superior left frontal lobe with no  associated midline shift or hemorrhage. Mild chronic microvascular ischemic change. Paranasal sinus disease as described above. Results from [place] encounter on 12/22/14   MRI BRAIN W WO CONT    Narrative **Final Report**       ICD Codes / Adm. Diagnosis: 435.9  943292 / Unspecified transient cerebral    Altered mental status  Examination:  MR BRAIN W AND WO CON  - [id number] - Dec 23 2014 10:56AM  Accession No:  [id number]  Reason:        REPORT:  CLINICAL HISTORY: Weakness    INDICATION: Weakness,  allergy. COMPARISON: None    TECHNIQUE: MR examination of the brain includes axial and sagittal T1   pre-and-post contrast, axial T2, axial FLAIR, axial gradient echo, axial   DWI, coronal T1 postcontrast.    Contrast: The patient was administered 15 mL Magnevist contrast material   axial and coronal T1-weighted postcontrast enhanced imaging was obtained. FINDINGS:     There is no intracranial mass. There is no intracranial hemorrhage. There is   no evidence of acute infarction. Scattered abnormal foci of increased T2 signal intensity in the coronal   radiata and centrum semiovale. Confluent periventricular increased T2 signal   intensity. Opacification in the inferior aspect of the left maxillary sinus. The major intracranial vascular flow-voids are grossly unremarkable. Cerebellopontine angles grossly unremarkable. No skull base mass.     There is no abnormal parenchymal enhancement. There is no abnormal meningeal   enhancement. The cavernous sinuses are symmetric. There is no Chiari. There   is no syrinx. Pituitary and infundibulum gross unremarkable. The brain   architecture is normal. There is no evidence of midline shift or   mass-effect. The ventricles are normal in size, position and configuration. There are no extra-axial fluid collections. The mastoid air cells and paranasal sinuses are well pneumatized and clear. IMPRESSION:    No intracranial mass, hemorrhage or evidence of acute infarction. Findings consistent with minimal chronic microvascular ischemic change. Signing/Reading Doctor: Bharti Tavera ([id number])    Approved: Bharti Tavera ([id number])  Dec 23 2014 12:18PM                                      XR Results   Results from Hospital Encounter encounter on 02/15/15   XR ANKLE RT MIN 3 V   Results from [place] encounter on 01/16/12   XR CHEST PORTABLE       VAS/US Results (maximum last 3):   Results from Hospital Encounter encounter on 12/22/14   DUPLEX CAROTID BILATERAL       TTE         EKG  Results for orders placed or performed during the hospital encounter of 11/09/20   EKG, 12 LEAD, INITIAL   Result Value Ref Range    Ventricular Rate 65 BPM    Atrial Rate 65 BPM    P-R Interval 152 ms    QRS Duration 92 ms    Q-T Interval 412 ms    QTC Calculation (Bezet) 428 ms    Calculated P Axis -11 degrees    Calculated R Axis 30 degrees    Calculated T Axis 24 degrees    Diagnosis       Normal sinus rhythm  When compared with ECG of 22-DEC-2014 14:17,  No significant change was found  Confirmed by Camille Wells MD, Elizabeth Shea ([id number]) on 11/9/2020 4:46:04 PM         Hospital Problems  Date Reviewed: 10/26/2020          Codes Class Noted POA    * (Principal) CVA (cerebral vascular accident) ([facility] [address] ICD-10-CM: I63.9  ICD-9-CM: 434.91  11/9/2020 Unknown        Essential hypertension ICD-10-CM: I10  ICD-9-CM: 401.9  6/3/2015 Yes Hyperlipidemia ICD-10-CM: E78.5  ICD-9-CM: 272.4  11/1/2011 Yes              Assessment/Plan:     Alec Ocampo is a 70 y.o. male with pmh significant for HTN, hyperlipidemia, and an episode of transient global amnesia in 2014. On the evening of 11/8/20, he noticed some trouble speaking and on 11/9 am, it was clear he was having some expressive aphasia, so he was brought to the ER where CT showed L frontal-parietal acute ischemia. CTA H&N unremarkable except for mild fusiform dilatation distal right vertebral artery at the level of C2 that could represent chronic dissection, however non-contributory in regards to this stroke. Teleneurology saw pt, and given timeframe pt not a tPA candidate nor deemed appropriate for neurointervention. Pt maintained on asa 81 mg, simvastatin 20 mg & losartan 100 / hctz 12.5. LDL 50. CUS without evidence of carotid artery stenosis; vertebrals are patent with antegrade flow. Given healthy appearing vasculature by imaging and carotid ultrasound, would like to assess for cardiac etiology. Plavix reloaded and now therapeutic by assay. Exam w/o focal deficits. Cardiology consulted and have agreed to eval for thrombogenic arrhythmia and assess for 30 day monitoring. Continue Plavix, now therapeutic  D/C ASA as non-responder  A1C 5.4    LDL within goal on statin  BP within goal (<140/<80). Will be managed outpatient by PCP. Can discharge for neurological standpoint. Thank you for this consult.     Signed By: Debra Mccarthy NP     November 11, 2020 8:37 AM

## 2020-11-11 NOTE — DISCHARGE INSTRUCTIONS
Patient Discharge Instructions    Gerald Vidal / [id number] : 1945    Admitted 2020 Discharged: 2020     Take Home Medications       · It is important that you take the medication exactly as they are prescribed. · Keep your medication in the bottles provided by the pharmacist and keep a list of the medication names, dosages, and times to be taken in your wallet. · Do not take other medications without consulting your doctor. What to do at Home    Recommended diet: Cardiac Diet. Recommended activity: Activity as tolerated and PT/OT per Home Health. Follow-up with Dr. Jill Arredondo in 1 week. Information obtained by :  I understand that if any problems occur once I am at home I am to contact my physician. I understand and acknowledge receipt of the instructions indicated above.                                                                                                                                            Physician's or R.N.'s Signature                                                                  Date/Time                                                                                                                                              Patient or Representative Signature                                                          Date/Time

## 2020-11-11 NOTE — ROUTINE PROCESS
Bedside shift change report given to Jayant Morton (oncoming nurse) by Addis (offgoing nurse). Report included the following information SBAR, Kardex, Intake/Output, MAR, Accordion, Recent Results, Cardiac Rhythm nsr and Dual Neuro Assessment.

## 2020-11-11 NOTE — PROGRESS NOTES
Problem: Falls - Risk of  Goal: *Absence of Falls  Description: Document Jey Perla Fall Risk and appropriate interventions in the flowsheet.   Outcome: Progressing Towards Goal  Note: Fall Risk Interventions:            Medication Interventions: Bed/chair exit alarm, Evaluate medications/consider consulting pharmacy, Patient to call before getting OOB, Teach patient to arise slowly                   Problem: Patient Education: Go to Patient Education Activity  Goal: Patient/Family Education  Outcome: Progressing Towards Goal     Problem: Patient Education: Go to Patient Education Activity  Goal: Patient/Family Education  Outcome: Progressing Towards Goal     Problem: TIA/CVA Stroke: Day 2 Until Discharge  Goal: Activity/Safety  Outcome: Progressing Towards Goal  Goal: Diagnostic Test/Procedures  Outcome: Progressing Towards Goal  Goal: Nutrition/Diet  Outcome: Progressing Towards Goal  Goal: Discharge Planning  Outcome: Progressing Towards Goal  Goal: Medications  Outcome: Progressing Towards Goal  Goal: Respiratory  Outcome: Progressing Towards Goal  Goal: Treatments/Interventions/Procedures  Outcome: Progressing Towards Goal  Goal: Psychosocial  Outcome: Progressing Towards Goal  Goal: *Verbalizes anxiety and depression are reduced or absent  Outcome: Progressing Towards Goal  Goal: *Absence of aspiration  Outcome: Progressing Towards Goal  Goal: *Absence of deep venous thrombosis signs and symptoms(Stroke Metric)  Outcome: Progressing Towards Goal  Goal: *Optimal pain control at patient's stated goal  Outcome: Progressing Towards Goal  Goal: *Tolerating diet  Outcome: Progressing Towards Goal  Goal: *Ability to perform ADLs and demonstrates progressive mobility and function  Outcome: Progressing Towards Goal  Goal: *Stroke education continued(Stroke Metric)  Outcome: Progressing Towards Goal     Problem: Ischemic Stroke: Discharge Outcomes  Goal: *Verbalizes anxiety and depression are reduced or absent  Outcome: Progressing Towards Goal  Goal: *Verbalize understanding of risk factor modification(Stroke Metric)  Outcome: Progressing Towards Goal  Goal: *Hemodynamically stable  Outcome: Progressing Towards Goal  Goal: *Absence of aspiration pneumonia  Outcome: Progressing Towards Goal  Goal: *Aware of needed dietary changes  Outcome: Progressing Towards Goal  Goal: *Verbalize understanding of prescribed medications including anti-coagulants, anti-lipid, and/or anti-platelets(Stroke Metric)  Outcome: Progressing Towards Goal  Goal: *Tolerating diet  Outcome: Progressing Towards Goal  Goal: *Aware of follow-up diagnostics related to anticoagulants  Outcome: Progressing Towards Goal  Goal: *Ability to perform ADLs and demonstrates progressive mobility and function  Outcome: Progressing Towards Goal  Goal: *Absence of DVT(Stroke Metric)  Outcome: Progressing Towards Goal  Goal: *Absence of aspiration  Outcome: Progressing Towards Goal  Goal: *Optimal pain control at patient's stated goal  Outcome: Progressing Towards Goal  Goal: *Home safety concerns addressed  Outcome: Progressing Towards Goal  Goal: *Describes available resources and support systems  Outcome: Progressing Towards Goal  Goal: *Verbalizes understanding of activation of EMS(911) for stroke symptoms(Stroke Metric)  Outcome: Progressing Towards Goal  Goal: *Understands and describes signs and symptoms to report to providers(Stroke Metric)  Outcome: Progressing Towards Goal  Goal: *Neurolgocially stable (absence of additional neurological deficits)  Outcome: Progressing Towards Goal  Goal: *Verbalizes importance of follow-up with primary care physician(Stroke Metric)  Outcome: Progressing Towards Goal  Goal: *Smoking cessation discussed,if applicable(Stroke Metric)  Outcome: Progressing Towards Goal  Goal: *Depression screening completed(Stroke Metric)  Outcome: Progressing Towards Goal     Problem: Patient Education: Go to Patient Education Activity  Goal: Patient/Family Education  Outcome: Progressing Towards Goal

## 2020-11-11 NOTE — PROGRESS NOTES
Cardiology consult note    Admitted for stroke of uncertain etiology. HTN/HL but no DM. Stroke possibly embolic. NO extracranial disease. Initial echo with normal EF although patient reports that he did not get any echo. Nevertheless, recommend echo with saline bubble study and 30 day tele monitor. Will set up through Office with 6 week follow up. Full note to follow. May be discharged from cardiac  Standpoint.

## 2020-11-11 NOTE — PROGRESS NOTES
DEMARIO met with pt to discuss home health orders and to offer freedom of choice. He stated that he would like to use any agency that is in his insurance network. DEMARIO sent referrals to [address] and [facility]. Will follow.  Misael Perrin

## 2020-11-11 NOTE — ROUTINE PROCESS
Bedside and Verbal shift change report given to Ciarra Anthony  RN (oncoming nurse) by Mili Dee RN (offgoing nurse). Report included the following information SBAR, Kardex, ED Summary, Procedure Summary, Intake/Output, MAR, Recent Results, Cardiac Rhythm NSR, Quality Measures and Dual Neuro Assessment.

## 2020-11-11 NOTE — PROGRESS NOTES
[facility] is unable to accept this pt and no other agencies have accepted him.  CM sent a referral to [address] [place] [address] via the [facility] portal. Sabrina Cobb

## 2020-11-11 NOTE — PROGRESS NOTES
Problem: Falls - Risk of  Goal: *Absence of Falls  Description: Document Nataliia Sanchez Fall Risk and appropriate interventions in the flowsheet.   Outcome: Progressing Towards Goal  Note: Fall Risk Interventions:            Medication Interventions: Teach patient to arise slowly, Patient to call before getting OOB                   Problem: TIA/CVA Stroke: Day 2 Until Discharge  Goal: Activity/Safety  Outcome: Progressing Towards Goal  Goal: Diagnostic Test/Procedures  Outcome: Progressing Towards Goal  Goal: Nutrition/Diet  Outcome: Progressing Towards Goal  Goal: Discharge Planning  Outcome: Progressing Towards Goal  Goal: Medications  Outcome: Progressing Towards Goal  Goal: Respiratory  Outcome: Progressing Towards Goal  Goal: Treatments/Interventions/Procedures  Outcome: Progressing Towards Goal  Goal: Psychosocial  Outcome: Progressing Towards Goal  Goal: *Verbalizes anxiety and depression are reduced or absent  Outcome: Progressing Towards Goal  Goal: *Absence of aspiration  Outcome: Progressing Towards Goal  Goal: *Absence of deep venous thrombosis signs and symptoms(Stroke Metric)  Outcome: Progressing Towards Goal  Goal: *Optimal pain control at patient's stated goal  Outcome: Progressing Towards Goal  Goal: *Tolerating diet  Outcome: Progressing Towards Goal  Goal: *Ability to perform ADLs and demonstrates progressive mobility and function  Outcome: Progressing Towards Goal  Goal: *Stroke education continued(Stroke Metric)  Outcome: Progressing Towards Goal

## 2020-11-11 NOTE — PROGRESS NOTES
Pt feels well, and he is anxious to go home. Carotids OK. ECHO report in computer, but pt & staff insist he did not have one???  I have re-ordered one & asked staff to check on all of this. Assuming ECHO OK, I am discharging him on ASA & Plavix, if OK with neurology. Holding Hyzaar for now, as BP's been good here. HH/PT. D/w wife, present.

## 2020-11-11 NOTE — ROUTINE PROCESS
Bedside RN performed patient education and medication education. Discharge concerns initiated and discussed with patient and his wife, including clarification on \"who\" assists the patient at their home and instructions for when the home going patient should call their provider after discharge. Opportunity for questions and clarification was provided. Patient receptive to education: YES Patient stated: \"So the plavix works? \" 
Barriers to Education: none Diagnosis Education given:  YES Length of stay: 2 Expected Day of Discharge: 2 Ask if they have \"Help at Home\" & add to white board? YES Education Day #: 2 Medication Education Given:  YES 
M in the box Medication name: plavix, lipitor Pt aware of HCAHPS survey: YES Stroke Education documented in Patient Education: YES Core Measures Documented in [facility]: 
Risk Factors: YES Warning signs of stroke: YES When to Activate 911: YES Medication Education for Risk Factors: YES Smoking cessation if applicable: YES Written Education Given:  Matt Cristobal Discharge NIH Completed: YES Score: 0 BRAINS: YES Follow Up Appointment Made: NO 
Date/Time if applicable: na

## 2020-11-12 DIAGNOSIS — I63.9 CEREBROVASCULAR ACCIDENT (CVA), UNSPECIFIED MECHANISM (HCC): Primary | ICD-10-CM

## 2020-11-12 NOTE — CONSULTS
[facility]: Kendal Alexander  [phone]          Cardiology Consult/Progress Note      Requesting/referring provider: Dr. Enedina Antunez, Dr. Faiza Alcocer,  Reason for Consult: Assessment of possible cardioembolic etiology for recent stroke    HPI: Ellie Murcia, a 70y.o. year-old who presents for evaluation of assessment of possible cardioembolic etiology for recent stroke. Mr. Monika Couch recently presented with acute expressive aphasia. He was noted to have left frontoparietal stroke. Imaging revealed occlusion of left M2/M3 segment likely suggestive of embolic nature. MRI brain demonstrated wedge-shaped left frontal acute stroke. There is no evidence of any extracranial carotid disease. He has longstanding hypertension and hyperlipidemia both of which were well treated. Since he presented outside the window for TPA/embolectomy now this was performed. Nonetheless, most of his speech deficit has now improved. Cardiology has been asked to evaluate him for possible cardioembolic etiologies. There is no evidence pointing towards intracranial vasculitis, small vessel vasculopathy. Investigations personally reviewed by me  Inpatient telemetry: No atrial fibrillation  ECG sinus rhythm  MRI left frontal diffusion-weighted imaging abnormality  CTA occlusion of left M2/M3    Assessment/Plan:    1. Recent acute left frontal stroke possibly embolic etiology  2. No evidence of extracranial carotid disease  3. No history of diabetes mellitus or prior known atrial fibrillation  4. No known prior thromboembolic phenomenon or prothrombotic conditions    Mr. Monika Couch. To the hospital with acute stroke. She has no clear obvious etiology of the stroke which appears to be possibly embolic. Apart from relatively poor visualize M2 M3 segment there is no evidence of any other intracranial stenosis.     Possible cardiogram etiologies may include atrial fibrillation, PFO, LV systolic dysfunction. His echocardiogram today does not show any evidence of valvular abnormalities or LV systolic dysfunction. He was set up for at least a 30-day monitor and possibly prolonged rhythm investigation through the clinic. His echocardiogram shows normal LV function with no evidence of any definitive right to left intracardiac shunt. I will defer prothrombotic work-up to Dr. Jill Arredondo. Patient will be seen back in the clinic after 30 days rhythm monitoring. []    High complexity decision making was performed  []    Patient is at high-risk of decompensation with multiple organ involvement  He  has a past medical history of Asthma, High cholesterol, Hypertension, Peptic ulcer (), TGA (transient global amnesia) (2014), and Vertigo (2012). Review of system:Patient reports no dyspnea/PND/Orthpnea/CP. He reports no cough/fever/abdominal pain. All other systems negative except as above. Family History   Problem Relation Age of Onset   Abby Heller Stroke Mother          CVA age 52's      Social History     Socioeconomic History    Marital status:      Spouse name: Not on file    Number of children: Not on file    Years of education: Not on file    Highest education level: Not on file   Tobacco Use    Smoking status: Never Smoker    Smokeless tobacco: Never Used   Substance and Sexual Activity    Alcohol use: Yes     Alcohol/week: 5.8 standard drinks     Types: 7 drink(s) per week    Drug use: No      PE  Vitals:    20 0955 20 1020 20 1122 20 1350   BP: 124/77  124/77 133/78   Pulse: 83   65   Resp: 11   16   Temp: 98.1 °F (36.7 °C)   98.3 °F (36.8 °C)   SpO2: 94% 94%  97%   Weight:   163 lb 8 oz (74.2 kg)    Height:   5' 10\" (1.778 m)     Body mass index is 23.46 kg/m². General:    Alert, cooperative, no distress. Psychiatric:    Normal Mood and affect    Eye/ENT:      Pupils equal, No asymmetry, Conjunctival pink.  Able to hear voice at normal amplitude   Lungs:      Visibly symmetric chest expansion, No palpable tenderness. Clear to auscultation bilaterally. Heart[de-identified]    Regular rate and rhythm, S1, S2 normal, no murmur, click, rub or gallop. No JVD, Normal palpable peripheral pulses. No cyanosis   Abdomen:     Soft, non-tender. Bowel sounds normal. No masses,  No      organomegaly. Extremities:   Extremities normal, atraumatic, no edema. Neurologic:   CN II-XII grossly intact.  No gross focal deficits           Recent Labs:  Lab Results   Component Value Date/Time    Cholesterol, total 155 11/10/2020 04:15 AM    HDL Cholesterol 61 11/10/2020 04:15 AM    LDL, calculated 50 11/10/2020 04:15 AM    Triglyceride 220 (H) 11/10/2020 04:15 AM    CHOL/HDL Ratio 2.5 11/10/2020 04:15 AM     Lab Results   Component Value Date/Time    Creatinine 1.16 11/10/2020 04:15 AM     Lab Results   Component Value Date/Time    BUN 26 (H) 11/10/2020 04:15 AM     Lab Results   Component Value Date/Time    Potassium 3.8 11/10/2020 04:15 AM     Lab Results   Component Value Date/Time    Hemoglobin A1c 5.4 2020 02:45 AM     Lab Results   Component Value Date/Time    HGB (POC) 13.5 2019 01:46 PM    HGB 11.3 (L) 11/10/2020 04:15 AM     Lab Results   Component Value Date/Time    PLATELET 206  04:15 AM       Reviewed:  Past Medical History:   Diagnosis Date    Asthma     High cholesterol     Hypertension     Peptic ulcer     on med- no problems since    TGA (transient global amnesia) 2014    Vertigo 2012     Social History     Tobacco Use   Smoking Status Never Smoker   Smokeless Tobacco Never Used     Social History     Substance and Sexual Activity   Alcohol Use Yes    Alcohol/week: 5.8 standard drinks    Types: 7 drink(s) per week     Allergies   Allergen Reactions    Peanut Unknown (comments)     Family History   Problem Relation Age of Onset    Stroke Mother          CVA age 52's        No current facility-administered medications for this encounter. Current Outpatient Medications   Medication Sig    atorvastatin (LIPITOR) 40 mg tablet Take 1 Tab by mouth daily.  clopidogreL (Plavix) 75 mg tab Take 1 Tab by mouth daily.  levocetirizine (XYZAL) 5 mg tablet Take 1 Tab by mouth daily.  famotidine (PEPCID) 40 mg tablet TAKE 1 TABLET DAILY    fluticasone propionate (FLOVENT HFA) 110 mcg/actuation inhaler Take 2 Puffs by inhalation every twelve (12) hours. Rinse mouth after use    montelukast (SINGULAIR) 10 mg tablet Take 1 Tab by mouth daily.  butalbital-acetaminophen-caffeine (FIORICET, ESGIC) -40 mg per tablet Take 1-2 Tabs by mouth every six (6) hours as needed for Pain.  albuterol (PROVENTIL HFA, VENTOLIN HFA, PROAIR HFA) 90 mcg/actuation inhaler Take 2 Puffs by inhalation four (4) times daily as needed for Wheezing.  vitamin c-vitamin e (CRANBERRY CONCENTRATE) cap Take 1 capsule by mouth daily.  cyanocobalamin (VITAMIN B12) 500 mcg tablet Take 500 mcg by mouth daily.  multivitamin (ONE A DAY) tablet Take 1 Tab by mouth daily.  aspirin delayed-release 81 mg tablet Take  by mouth daily.  ferrous sulfate (IRON) 325 mg (65 mg iron) tablet Take 325 mg by mouth daily (before breakfast). Yair Hooper MD11/11/20 There are other unrelated non-urgent complaints, but due to the busy schedule and the amount of time I've already spent with him, time does not permit me to address these routine issues at today's visit. I've requested another appointment to review these additional issues. ATTENTION:   This medical record was transcribed using an electronic medical records/speech recognition system. Although proofread, it may and can contain electronic, spelling and other errors. Corrections may be executed at a later time. Please feel free to contact us for any clarifications as needed.     [facility] [place]  [address], [address] [place] [address]

## 2020-11-13 ENCOUNTER — TELEPHONE (OUTPATIENT)
Dept: CARDIOLOGY CLINIC | Age: 75
End: 2020-11-13

## 2020-11-13 NOTE — TELEPHONE ENCOUNTER
----- Message from Qasim Castro MD sent at 11/12/2020  5:44 PM EST -----  He was discharged yesterday  ----- Message -----  From: Rosenda Eng RN  Sent: 11/12/2020   8:39 AM EST  To: Bety Babcockstone. HUGH Robert, Qasim Castro MD    Future Appointments  11/12/2020 2:00 PM    raquel Rodgers[address]  12/14/2020 2:30 PM    Bal Alfredo MD [facility]  12/22/2020 9:40 AM    MD SHAMIR Kelsey          AMB    Please let me know if unable to add appointment to discharge paperwork.      Thank you   ORLÉEDSON   ----- Message -----  From: Rhea Arauz MD  Sent: 11/11/2020   8:07 PM EST  To: Madison Steiner RN    Needs30 day monitor and follow up with me in 6-8 weeks

## 2020-11-13 NOTE — TELEPHONE ENCOUNTER
Called patient. Two patient indentifiers verified. Pt was informed of date and time. Pt was also informed about the monitor that will be mailed to him. Pt verbalized understanding and denies any further questions at this time.      Future Appointments   Date Time Provider Mya Ocampo   11/16/2020  1:00 PM Wolfgang Bergman [facility]   11/19/2020 To Be Determined KRISTOPHER Forbes [facility]   12/14/2020  2:30 PM Bal Alfredo MD [facility]   12/22/2020  9:40 AM MD SHAMIR Kelsey AMB

## 2020-11-14 NOTE — DISCHARGE SUMMARY
Physician Discharge Summary     Patient ID:  Darya Holder  [id number]  9 y.o.  1945    Admit date: 11/9/2020    Discharge date and time: 11/14/2020    Briefly, pt was admitted with CVA (cerebral vascular accident) ([facility] [address] [I63.9]. For details of admission, see H&P. Hospital Course:  Pt was admitted with some mild expressive aphasia, and CT imaging in the ER suggested acute ischemic CVA. An MRI showed a moderate acute infarction in the anterior superior left frontal lobe. His BP med was held to allow permissive hypertension. Statin was increased despite good lipid numbers. Plavix was added. Neurology saw pt. Labs suggested pt is a non-responder to ASA, so this can be stopped (continue Plavix). Cardiology saw pt, and a 30 day monitor is being placed on pt to look for occult afib, etc.  Pt recovered well, and it is difficult to clinically detect any significant residual neurologic sx's. He is discharged home with f/u with me & cardiology. His BP medication is being held, at least for now, as his BP's were excellent in the hospital.      Discharge Dx: CVA    Condition at discharge: Improved    Disposition: home    Patient Instructions:   Cannot display discharge medications since this patient is not currently admitted. Follow-up with Dr. Meena Russell in 1 week.       Signed:  Marielos Hurtado MD  11/14/2020  5:26 AM

## 2020-11-16 ENCOUNTER — HOME CARE VISIT (OUTPATIENT)
Dept: SCHEDULING | Facility: HOME HEALTH | Age: 75
End: 2020-11-16

## 2020-11-16 PROCEDURE — G0151 HHCP-SERV OF PT,EA 15 MIN: HCPCS

## 2020-11-16 PROCEDURE — G0153 HHCP-SVS OF S/L PATH,EA 15MN: HCPCS

## 2020-11-17 ENCOUNTER — HOME CARE VISIT (OUTPATIENT)
Dept: HOME HEALTH SERVICES | Facility: HOME HEALTH | Age: 75
End: 2020-11-17

## 2020-11-17 VITALS
BODY MASS INDEX: 23.05 KG/M2 | RESPIRATION RATE: 16 BRPM | WEIGHT: 161 LBS | DIASTOLIC BLOOD PRESSURE: 65 MMHG | SYSTOLIC BLOOD PRESSURE: 138 MMHG | HEART RATE: 60 BPM | HEIGHT: 70 IN | OXYGEN SATURATION: 96 % | TEMPERATURE: 97.6 F

## 2020-11-17 VITALS
HEART RATE: 64 BPM | OXYGEN SATURATION: 97 % | SYSTOLIC BLOOD PRESSURE: 150 MMHG | TEMPERATURE: 97.6 F | RESPIRATION RATE: 16 BRPM | DIASTOLIC BLOOD PRESSURE: 90 MMHG

## 2020-12-22 ENCOUNTER — TELEPHONE (OUTPATIENT)
Dept: CARDIOLOGY CLINIC | Age: 75
End: 2020-12-22

## 2020-12-22 ENCOUNTER — OFFICE VISIT (OUTPATIENT)
Dept: CARDIOLOGY CLINIC | Age: 75
End: 2020-12-22
Payer: COMMERCIAL

## 2020-12-22 VITALS
OXYGEN SATURATION: 98 % | HEIGHT: 70 IN | BODY MASS INDEX: 24.77 KG/M2 | DIASTOLIC BLOOD PRESSURE: 80 MMHG | WEIGHT: 173 LBS | HEART RATE: 62 BPM | SYSTOLIC BLOOD PRESSURE: 140 MMHG | RESPIRATION RATE: 18 BRPM

## 2020-12-22 DIAGNOSIS — E78.5 HYPERLIPIDEMIA, UNSPECIFIED HYPERLIPIDEMIA TYPE: ICD-10-CM

## 2020-12-22 DIAGNOSIS — Z09 HOSPITAL DISCHARGE FOLLOW-UP: Primary | ICD-10-CM

## 2020-12-22 DIAGNOSIS — I63.9 CEREBROVASCULAR ACCIDENT (CVA), UNSPECIFIED MECHANISM (HCC): ICD-10-CM

## 2020-12-22 DIAGNOSIS — D64.9 ANEMIA, UNSPECIFIED TYPE: ICD-10-CM

## 2020-12-22 DIAGNOSIS — I10 ESSENTIAL HYPERTENSION: ICD-10-CM

## 2020-12-22 DIAGNOSIS — K27.9 PUD (PEPTIC ULCER DISEASE): ICD-10-CM

## 2020-12-22 PROCEDURE — 93000 ELECTROCARDIOGRAM COMPLETE: CPT | Performed by: INTERNAL MEDICINE

## 2020-12-22 PROCEDURE — 1111F DSCHRG MED/CURRENT MED MERGE: CPT | Performed by: INTERNAL MEDICINE

## 2020-12-22 PROCEDURE — 99215 OFFICE O/P EST HI 40 MIN: CPT | Performed by: INTERNAL MEDICINE

## 2020-12-22 NOTE — TELEPHONE ENCOUNTER
Patient is calling to discuss the details of the procedure that was mentioned in his visit today. Please advise.     Phone #: [phone]  Thanks

## 2020-12-22 NOTE — TELEPHONE ENCOUNTER
Patient returned call. Two patient indentifiers verified. Pt was informed that the procedure is an implantable loop recorder. Pt verbalized understanding and denies any further questions at this time.      Future Appointments   Date Time Provider Mya Ocampo   2/2/2021  4:20 PM MD SHAMIR Storey AMB   6/22/2021  9:40 AM MD SHAMIR Barton AMB

## 2021-02-02 ENCOUNTER — VIRTUAL VISIT (OUTPATIENT)
Dept: CARDIOLOGY CLINIC | Age: 76
End: 2021-02-02
Payer: COMMERCIAL

## 2021-02-02 DIAGNOSIS — I63.9 CRYPTOGENIC STROKE (HCC): Primary | ICD-10-CM

## 2021-02-02 DIAGNOSIS — E78.5 HYPERLIPIDEMIA, UNSPECIFIED HYPERLIPIDEMIA TYPE: ICD-10-CM

## 2021-02-02 DIAGNOSIS — I10 ESSENTIAL HYPERTENSION: ICD-10-CM

## 2021-02-02 PROCEDURE — 99203 OFFICE O/P NEW LOW 30 MIN: CPT | Performed by: INTERNAL MEDICINE

## 2021-02-03 ENCOUNTER — TELEPHONE (OUTPATIENT)
Dept: CARDIOLOGY CLINIC | Age: 76
End: 2021-02-03

## 2021-02-03 NOTE — H&P (VIEW-ONLY)
Cardiac Electrophysiology VIRTUAL Consult VISIT Note Pursuant to the emergency declaration under the [facility], [facility] waiver authority and the [employer] Act, this Virtual  Visit was conducted, with patient's consent, to reduce the patient's risk of exposure to COVID-19 and provide continuity of care for an established patient. Services were provided through a video synchronous discussion virtually to substitute for in-person clinic visit. Subjective:  
  
Monica Almanzar is a 70 y.o. patient who is seen for evaluation of cause of embolic stroke since he has risk of PAF In [facility] 2020 he had been seen by Dr Leslie Mcnally Mr. Markos Arreola then presented with acute expressive aphasia. He was noted to have left frontoparietal stroke. Brain Imaging revealed occlusion of left M2/M3 segment likely suggestive of embolic nature. MRI brain demonstrated wedge-shaped left frontal acute stroke. There is no evidence of any extracranial carotid disease. He has longstanding hypertension and hyperlipidemia both of which were well treated. later his speech deficit improved. 11/09/20 ECHO ADULT COMPLETE 11/11/2020 11/11/2020 Narrative · IAS: No atrial septal defect present. Agitated saline contrast study was  
performed. · LV: Estimated LVEF is 55 - 60%. Normal cavity size, wall thickness,  
systolic function (ejection fraction normal) and diastolic function. Wall  
motion: normal. Normal left ventricular strain. HT 5'10 Wt 163 lbs  (74.163kg) /77 BSA 1.91 Signed by: Ignacio Recinos MD  
 
 
 
 
Patient Active Problem List  
Diagnosis Code  Hyperlipidemia E78.5  Asthma J45.909  Encounter for long-term (current) drug use Z79.899  PUD (peptic ulcer disease) K27.9  TGA (transient global amnesia) G45.4  Essential hypertension I10  
 Anemia D64.9  CVA (cerebral vascular accident) ([facility] [address] [facility] I63.9 Current Outpatient Medications Medication Sig Dispense Refill  levocetirizine (XYZAL) 5 mg tablet Take 1 Tab by mouth daily. 90 Tab 1  
 montelukast (SINGULAIR) 10 mg tablet Take 1 Tab by mouth daily. 90 Tab 1  
 atorvastatin (LIPITOR) 40 mg tablet Take 1 Tab by mouth daily. 90 Tab 1  clopidogreL (Plavix) 75 mg tab Take 1 Tab by mouth daily. 90 Tab 1  potassium 99 mg tablet Take 99 mg by mouth daily.  famotidine (PEPCID) 40 mg tablet TAKE 1 TABLET DAILY 90 Tab 3  
 albuterol (PROVENTIL HFA, VENTOLIN HFA, PROAIR HFA) 90 mcg/actuation inhaler Take 2 Puffs by inhalation four (4) times daily as needed for Wheezing. 1 Inhaler 11  
 cyanocobalamin (VITAMIN B12) 500 mcg tablet Take 500 mcg by mouth daily.  multivitamin (ONE A DAY) tablet Take 1 Tab by mouth daily.  ferrous sulfate (IRON) 325 mg (65 mg iron) tablet Take 325 mg by mouth daily (before breakfast).  triamcinolone acetonide (KENALOG) 0.1 % topical cream Apply  to affected area two (2) times daily as needed for Skin Irritation. use thin layer 453.6 g 3  
 fluticasone propionate (FLOVENT HFA) 110 mcg/actuation inhaler Take 2 Puffs by inhalation every twelve (12) hours. Rinse mouth after use 1 Inhaler 5  
 butalbital-acetaminophen-caffeine (FIORICET, ESGIC) -40 mg per tablet Take 1-2 Tabs by mouth every six (6) hours as needed for Pain. 30 Tab 3  
 vitamin c-vitamin e (CRANBERRY CONCENTRATE) cap Take 1 capsule by mouth daily. Allergies Allergen Reactions  Peanut Unknown (comments) Past Medical History:  
Diagnosis Date  Asthma  CVA (cerebral vascular accident) ([facility] 11/9/2020  High cholesterol  Hypertension  Peptic ulcer 2000  
 on med- no problems since  TGA (transient global amnesia) 12/22/2014  Vertigo Nov. 2012 Past Surgical History:  
Procedure Laterality Date  ENDOSCOPY, COLON, DIAGNOSTIC  2006  
 neg  HX HERNIA REPAIR    
 HX ORTHOPAEDIC    
 left shoulder 2011  HX TONSILLECTOMY Family History Problem Relation Age of Onset  Stroke Mother  CVA age 52's Social History Tobacco Use  Smoking status: Never Smoker  Smokeless tobacco: Never Used Substance Use Topics  Alcohol use: Yes Alcohol/week: 5.8 standard drinks Types: 7 drink(s) per week Review of Systems:  
Constitutional: Negative for fever, chills, weight loss, malaise/fatigue. HEENT: Negative for nosebleeds, vision changes. Respiratory: Negative for cough, hemoptysis Cardiovascular: Negative for chest pain, palpitations, orthopnea, claudication, leg swelling, syncope, and PND. Gastrointestinal: Negative for nausea, vomiting, diarrhea, blood in stool and melena. Genitourinary: Negative for dysuria, and hematuria. Musculoskeletal: Negative for myalgias, arthralgia. Skin: Negative for rash. Heme: Does not bleed or bruise easily. Neurological: Negative for current speech change and focal weakness Objective:  
 Due to this being a TeleHealth evaluation, many elements of the physical examination are unable to be assessed. General: Well developed, in no acute distress, cooperative and alert HEENT: Pupils equal/round. No marked JVD visible on video. Respiratory: No audible wheezing, no signs of respiratory distress, lips non cyanotic Extremities:  Not able to see leg edema on video Neuro: A&Ox3, speech clear, no facial droop, answering questions appropriately Skin: Skin color is normal. No rashes or lesions. Non diaphoretic on visible skin during exam 
  
 
 
Assessment/Plan: ICD-10-CM ICD-9-CM 1. Cryptogenic stroke (HCC)  I63.9 434.91   
2. Essential hypertension  I10 401.9 3. Hyperlipidemia, unspecified hyperlipidemia type  E78.5 272.4 I have discussed with him possible PAF and embolic stroke He agrees to risks and benefits of long term PAF monitoring and implanted loop monitor procedure Continue with BP control and antiplatelet for now Keep LDL < 70 Thank you for involving me in this patient's care and please call with further concerns or questions. Jason Lorenzo M.D. Electrophysiology/Cardiology [facility] [place] [address]                            
[phone]

## 2021-02-03 NOTE — TELEPHONE ENCOUNTER
Called pt two patient identifiers confirmed. Scheduled pt for ILR on 2/26/2021 @ 12:00 pm. Amber Hoffmann over instructions with Pt and will mail pt hard copy. Pt verbalized understanding of information discussed w/ no further questions at this time.

## 2021-02-03 NOTE — LETTER
2/3/2021 4:05 PM 
 
[facility] Ace Gonzalez [address] [facility] [id number] Your Implantable Loop recorder procedure has been scheduled for 2/26/2021 at 12:00 pm, at [facility]. 
 
Please report to Admitting Department by 10:00 am, or 2 hours prior to your scheduled procedure. Please bring a list of your current medications and medication bottles, if able, to the hospital on this day. You will be unable to drive after your procedure so please make sure to bring someone with you to your procedure. You will need labs drawn prior to your procedure. Please go to [facility] to have this done no sooner than 2/3/2021 and no later than 2/24/2021. You should not stop your medication. After your procedure, you will need to follow up with Dr. Forbes Pod nurse.  Your follow-up appointment has been scheduled for 3/5/2021 at 9:00 am.  
 
 
 
 
 
Sincerely, 
 
 
Beau Rodriguez MD

## 2021-02-03 NOTE — PROGRESS NOTES
Cardiac Electrophysiology VIRTUAL Consult VISIT Note     Pursuant to the emergency declaration under the [facility], [facility] waiver authority and the [employer] Act, this Virtual  Visit was conducted, with patient's consent, to reduce the patient's risk of exposure to COVID-19 and provide continuity of care for an established patient. Services were provided through a video synchronous discussion virtually to substitute for in-person clinic visit. Subjective:      Evelin Islas is a 70 y.o. patient who is seen for evaluation of cause of embolic stroke since he has risk of PAF    In NOV 2020 he had been seen by Dr Jennifer Henry    Mr. Easton Basurto then presented with acute expressive aphasia. He was noted to have left frontoparietal stroke. Brain Imaging revealed occlusion of left M2/M3 segment likely suggestive of embolic nature. MRI brain demonstrated wedge-shaped left frontal acute stroke. There is no evidence of any extracranial carotid disease. He has longstanding hypertension and hyperlipidemia both of which were well treated. later his speech deficit improved. 11/09/20   ECHO ADULT COMPLETE 11/11/2020 11/11/2020    Narrative · IAS: No atrial septal defect present. Agitated saline contrast study was   performed. · LV: Estimated LVEF is 55 - 60%. Normal cavity size, wall thickness,   systolic function (ejection fraction normal) and diastolic function. Wall   motion: normal. Normal left ventricular strain. HT 5'10   Wt 163 lbs  (74.163kg)  /77   BSA 1.91         Signed by:  Alex Holman MD           Patient Active Problem List   Diagnosis Code    Hyperlipidemia E78.5    Asthma J45.909    Encounter for long-term (current) drug use Z79.899    PUD (peptic ulcer disease) K27.9    TGA (transient global amnesia) G45.4    Essential hypertension I10    Anemia D64.9    CVA (cerebral vascular accident) ([facility] I63.9     Current Outpatient Medications   Medication Sig Dispense Refill    levocetirizine (XYZAL) 5 mg tablet Take 1 Tab by mouth daily. 90 Tab 1    montelukast (SINGULAIR) 10 mg tablet Take 1 Tab by mouth daily. 90 Tab 1    atorvastatin (LIPITOR) 40 mg tablet Take 1 Tab by mouth daily. 90 Tab 1    clopidogreL (Plavix) 75 mg tab Take 1 Tab by mouth daily. 90 Tab 1    potassium 99 mg tablet Take 99 mg by mouth daily.  famotidine (PEPCID) 40 mg tablet TAKE 1 TABLET DAILY 90 Tab 3    albuterol (PROVENTIL HFA, VENTOLIN HFA, PROAIR HFA) 90 mcg/actuation inhaler Take 2 Puffs by inhalation four (4) times daily as needed for Wheezing. 1 Inhaler 11    cyanocobalamin (VITAMIN B12) 500 mcg tablet Take 500 mcg by mouth daily.  multivitamin (ONE A DAY) tablet Take 1 Tab by mouth daily.  ferrous sulfate (IRON) 325 mg (65 mg iron) tablet Take 325 mg by mouth daily (before breakfast).  triamcinolone acetonide (KENALOG) 0.1 % topical cream Apply  to affected area two (2) times daily as needed for Skin Irritation. use thin layer 453.6 g 3    fluticasone propionate (FLOVENT HFA) 110 mcg/actuation inhaler Take 2 Puffs by inhalation every twelve (12) hours. Rinse mouth after use 1 Inhaler 5    butalbital-acetaminophen-caffeine (FIORICET, ESGIC) -40 mg per tablet Take 1-2 Tabs by mouth every six (6) hours as needed for Pain. 30 Tab 3    vitamin c-vitamin e (CRANBERRY CONCENTRATE) cap Take 1 capsule by mouth daily.        Allergies   Allergen Reactions    Peanut Unknown (comments)     Past Medical History:   Diagnosis Date    Asthma     [facility]    High cholesterol     Hypertension     Peptic ulcer 2000    on med- no problems since    TGA (transient global amnesia) 12/22/2014    Vertigo Nov. 2012     Past Surgical History:   Procedure Laterality Date    ENDOSCOPY, COLON, DIAGNOSTIC  2006    neg    HX HERNIA REPAIR      HX ORTHOPAEDIC      left shoulder 2011    HX TONSILLECTOMY       Family History   Problem Relation Age of Onset    Stroke Mother          CVA age 52's     Social History     Tobacco Use    Smoking status: Never Smoker    Smokeless tobacco: Never Used   Substance Use Topics    Alcohol use: Yes     Alcohol/week: 5.8 standard drinks     Types: 7 drink(s) per week        Review of Systems:   Constitutional: Negative for fever, chills, weight loss, malaise/fatigue. HEENT: Negative for nosebleeds, vision changes. Respiratory: Negative for cough, hemoptysis  Cardiovascular: Negative for chest pain, palpitations, orthopnea, claudication, leg swelling, syncope, and PND. Gastrointestinal: Negative for nausea, vomiting, diarrhea, blood in stool and melena. Genitourinary: Negative for dysuria, and hematuria. Musculoskeletal: Negative for myalgias, arthralgia. Skin: Negative for rash. Heme: Does not bleed or bruise easily. Neurological: Negative for current speech change and focal weakness     Objective:    Due to this being a TeleHealth evaluation, many elements of the physical examination are unable to be assessed. General: Well developed, in no acute distress, cooperative and alert  HEENT: Pupils equal/round. No marked JVD visible on video. Respiratory: No audible wheezing, no signs of respiratory distress, lips non cyanotic  Extremities:  Not able to see leg edema on video  Neuro: A&Ox3, speech clear, no facial droop, answering questions appropriately  Skin: Skin color is normal. No rashes or lesions. Non diaphoretic on visible skin during exam         Assessment/Plan:       ICD-10-CM ICD-9-CM    1. Cryptogenic stroke (HCC)  I63.9 434.91    2. Essential hypertension  I10 401.9    3.  Hyperlipidemia, unspecified hyperlipidemia type  E78.5 272.4        I have discussed with him possible PAF and embolic stroke  He agrees to risks and benefits of long term PAF monitoring and implanted loop monitor procedure    Continue with BP control and antiplatelet for now  Keep LDL < 70    Thank you for involving me in this patient's care and please call with further concerns or questions. Angelito Glynn M.D.   Electrophysiology/Cardiology  [facility] [place]  [address] [place] [address]                             [phone]

## 2021-02-19 RX ORDER — SODIUM CHLORIDE 0.9 % (FLUSH) 0.9 %
5-40 SYRINGE (ML) INJECTION EVERY 8 HOURS
Status: CANCELLED | OUTPATIENT
Start: 2021-02-19

## 2021-02-19 RX ORDER — SODIUM CHLORIDE 0.9 % (FLUSH) 0.9 %
5-40 SYRINGE (ML) INJECTION AS NEEDED
Status: CANCELLED | OUTPATIENT
Start: 2021-02-19

## 2021-02-26 ENCOUNTER — HOSPITAL ENCOUNTER (OUTPATIENT)
Age: 76
Setting detail: OUTPATIENT SURGERY
Discharge: HOME | End: 2021-02-26
Attending: INTERNAL MEDICINE | Admitting: INTERNAL MEDICINE
Payer: COMMERCIAL

## 2021-02-26 VITALS
BODY MASS INDEX: 23.62 KG/M2 | SYSTOLIC BLOOD PRESSURE: 179 MMHG | HEART RATE: 61 BPM | DIASTOLIC BLOOD PRESSURE: 102 MMHG | TEMPERATURE: 97.9 F | RESPIRATION RATE: 15 BRPM | HEIGHT: 70 IN | OXYGEN SATURATION: 97 % | WEIGHT: 165 LBS

## 2021-02-26 DIAGNOSIS — I63.9 CEREBROVASCULAR ACCIDENT (CVA), UNSPECIFIED MECHANISM (HCC): ICD-10-CM

## 2021-02-26 DIAGNOSIS — I63.9 STROKE-IN-EVOLUTION SYNDROME (HCC): ICD-10-CM

## 2021-02-26 DIAGNOSIS — Z95.818 STATUS POST PLACEMENT OF IMPLANTABLE LOOP RECORDER: ICD-10-CM

## 2021-02-26 PROCEDURE — 74011000250 HC RX REV CODE- 250: Performed by: INTERNAL MEDICINE

## 2021-02-26 PROCEDURE — 33285 INSJ SUBQ CAR RHYTHM MNTR: CPT | Performed by: INTERNAL MEDICINE

## 2021-02-26 PROCEDURE — C1764 EVENT RECORDER, CARDIAC: HCPCS | Performed by: INTERNAL MEDICINE

## 2021-02-26 PROCEDURE — 2709999900 HC NON-CHARGEABLE SUPPLY: Performed by: INTERNAL MEDICINE

## 2021-02-26 PROCEDURE — 77030010507 HC ADH SKN DERMBND J&J -B: Performed by: INTERNAL MEDICINE

## 2021-02-26 DEVICE — MON LNQ11 REVEAL LINQ USA FW2.0
Type: IMPLANTABLE DEVICE | Status: FUNCTIONAL
Brand: REVEAL LINQ™

## 2021-02-26 RX ORDER — SODIUM CHLORIDE 0.9 % (FLUSH) 0.9 %
5-40 SYRINGE (ML) INJECTION AS NEEDED
Status: DISCONTINUED | OUTPATIENT
Start: 2021-02-26 | End: 2021-02-26 | Stop reason: HOSPADM

## 2021-02-26 RX ORDER — LIDOCAINE HYDROCHLORIDE 10 MG/ML
1-20 INJECTION INFILTRATION; PERINEURAL ONCE
Status: COMPLETED | OUTPATIENT
Start: 2021-02-26 | End: 2021-02-26

## 2021-02-26 RX ORDER — SODIUM CHLORIDE 0.9 % (FLUSH) 0.9 %
5-40 SYRINGE (ML) INJECTION EVERY 8 HOURS
Status: DISCONTINUED | OUTPATIENT
Start: 2021-02-26 | End: 2021-02-26 | Stop reason: HOSPADM

## 2021-02-26 RX ORDER — CEPHALEXIN 500 MG/1
500 CAPSULE ORAL 3 TIMES DAILY
Qty: 9 CAP | Refills: 0 | Status: SHIPPED | OUTPATIENT
Start: 2021-02-26 | End: 2021-03-01

## 2021-02-26 RX ADMIN — LIDOCAINE HYDROCHLORIDE 9 ML: 10 INJECTION, SOLUTION INFILTRATION; PERINEURAL at 10:42

## 2021-02-26 NOTE — PROGRESS NOTES
Cardiac Cath Lab Recovery Arrival Note:      Bradford Reynoso arrived to Cardiac Cath Lab, Recovery Area. Staff introduced to patient. Patient identifiers verified with NAME and DATE OF BIRTH. Procedure verified with patient. Consent forms reviewed and signed by patient or authorized representative and verified. Allergies verified. Patient and family oriented to department. Patient and family informed of procedure and plan of care. Questions answered with review. Patient prepped for procedure, per orders from physician, prior to arrival.    Patient on cardiac monitor, non-invasive blood pressure, SPO2 monitor. On room air. Patient is A&Ox 4. Patient reports no pain. Patient in stretcher, in low position, with side rails up, call bell within reach, patient instructed to call if assistance as needed. Patient prep in: [address], [place] [address]. Patient family has pager # [phone]  Family in: Waiting area.    Prep by: SUNNY

## 2021-02-26 NOTE — INTERVAL H&P NOTE
Update History & Physical 
 
The Patient's History and Physical of February 2, 2021 was reviewed with the patient and I examined the patient. There was no change. The surgical site was confirmed by the patient and me. Plan:  The risk, benefits, expected outcome, and alternative to the recommended procedure have been discussed with the patient. Patient understands and wants to proceed with the procedure.  
 
Electronically signed by Nelly Simpson MD on 2/26/2021 at 10:34 AM

## 2021-02-26 NOTE — DISCHARGE INSTRUCTIONS
Discharge Instructions Post ILR Implant    1. You may remove your dressing the day following your procedure. Do not peel or scrub underlying skin glue. 2.  You may shower the day following your procedure. 3.  A prescription for antibiotics has been sent electronically to your pharmacy on record. Please pick it up & take as directed. 4.  Follow up in Dr. Maria A Corona office as noted below for wound & device check on 03/05/2021. Future Appointments   Date Time Provider Mya Ocampo   3/5/2021  9:00 AM Kyle Pruett BS AMB   3/5/2021  9:30 AM WOUND CHECKS, JER BARKSDALE BS AMB   4/6/2021  8:30 AM Angela Curtis MD [facility] SCHED   6/22/2021  9:40 AM MD Alyx Christine M.D.   Electrophysiology/Cardiology  [facility] and [facility] [place]  [address] [place] [address]                               [phone]

## 2021-02-26 NOTE — PROGRESS NOTES
Postoperative discharge instructions reviewed with patient and all questions answered. Patient instructed to take the entire course of antibiotic (Keflex) prescribed by Dr. Kalpesh Ann. Patient dressed self and stood without dizziness.

## 2021-02-26 NOTE — PROGRESS NOTES
Patient's BP high in both arms today prior to and during the procedure. Patient instructed to check it outside the hospital environment and if it continues to be elevated, discuss with PCP. Patient did state he took his BP medication this morning.

## 2021-02-26 NOTE — PROCEDURES
Cardiac Procedure Note   Patient: Ace Elizabeth  MRN: [id number]  SSN: [id number]   YOB: 1945 Age: 70 y.o.   Sex: male    Date of Procedure: 2/26/2021   Pre-procedure Diagnosis: cryptogenic stroke  Post-procedure Diagnosis: same  Procedure: loop recorder implant  :  Dr. Beau Rodriguez MD    Assistant(s):  None  Anesthesia: lidocaine  Estimated Blood Loss: Less than 10 mL   Specimens Removed: None  Findings: left chest loop recorder  Complications: None   Implants: [employer] ILR  Signed by:  Beau Rodriguez MD  2/26/2021  12:20 PM

## 2021-03-02 ENCOUNTER — TELEPHONE (OUTPATIENT)
Dept: CARDIOLOGY CLINIC | Age: 76
End: 2021-03-02

## 2021-03-02 NOTE — TELEPHONE ENCOUNTER
Verified patient with two types of identifiers. Notified patient that if his site is looking okay then he does not need to come into the office. Patient states site looks good- denies any redness, drainage, or swelling. Will ask device clinic to reach out to patient for education and make sure patient is connected.  Patient states he is free today after 2:00 pm. Patient to return call with any further questions

## 2021-03-02 NOTE — TELEPHONE ENCOUNTER
Returned pt call. Went over ILR education and how to send over a manually transmission from his [employer] box. Pt verbalized understanding and had no further questions.

## 2021-05-28 ENCOUNTER — OFFICE VISIT (OUTPATIENT)
Dept: CARDIOLOGY CLINIC | Age: 76
End: 2021-05-28
Payer: COMMERCIAL

## 2021-05-28 DIAGNOSIS — Z95.818 STATUS POST PLACEMENT OF IMPLANTABLE LOOP RECORDER: Primary | ICD-10-CM

## 2021-05-28 PROCEDURE — 93291 INTERROG DEV EVAL SCRMS IP: CPT

## 2021-05-28 PROCEDURE — 93298 REM INTERROG DEV EVAL SCRMS: CPT | Performed by: INTERNAL MEDICINE

## 2021-06-21 ENCOUNTER — TELEPHONE (OUTPATIENT)
Dept: CARDIOLOGY CLINIC | Age: 76
End: 2021-06-21

## 2021-06-21 NOTE — TELEPHONE ENCOUNTER
Patient would like to speak with the nurse regarding his upcoming appointment. Patient needs to rescheduled but is unsure if he needs to keep the appointment. Please advise.     Phone: [phone]

## 2021-06-21 NOTE — TELEPHONE ENCOUNTER
Verified patient with two types of identifiers. Patient is s/p ILR 2/26/21. Verified we are following remotely. Patient feels good! Will verify with Dr. Comfort Flores if follow up in July is necessary or if we can follow ILR remotely.

## 2021-06-22 NOTE — TELEPHONE ENCOUNTER
Per Dr. Esther Gupta is fine. Please cancel his appointment for 7/7\"    Verified patient with two types of identifiers. Notified of MD recommendations. Appointment cancelled. Will follow remotely from ILR. Patient verbalized understanding and will call with any other questions.

## 2021-08-24 ENCOUNTER — HOSPITAL ENCOUNTER (EMERGENCY)
Age: 76
Discharge: HOME | End: 2021-08-24
Attending: EMERGENCY MEDICINE | Admitting: EMERGENCY MEDICINE
Payer: COMMERCIAL

## 2021-08-24 VITALS
TEMPERATURE: 98.8 F | RESPIRATION RATE: 11 BRPM | SYSTOLIC BLOOD PRESSURE: 183 MMHG | HEART RATE: 63 BPM | OXYGEN SATURATION: 97 % | HEIGHT: 70 IN | WEIGHT: 165 LBS | BODY MASS INDEX: 23.62 KG/M2 | DIASTOLIC BLOOD PRESSURE: 109 MMHG

## 2021-08-24 DIAGNOSIS — J20.9 ACUTE BRONCHITIS, UNSPECIFIED ORGANISM: Primary | ICD-10-CM

## 2021-08-24 LAB
ALBUMIN SERPL-MCNC: 3.5 G/DL (ref 3.5–5)
ALBUMIN/GLOB SERPL: 1.1 {RATIO} (ref 1.1–2.2)
ALP SERPL-CCNC: 101 U/L (ref 45–117)
ALT SERPL-CCNC: 30 U/L (ref 12–78)
ANION GAP SERPL CALC-SCNC: 3 MMOL/L (ref 5–15)
AST SERPL-CCNC: 20 U/L (ref 15–37)
ATRIAL RATE: 61 BPM
BASOPHILS # BLD: 0 K/UL (ref 0–0.1)
BASOPHILS NFR BLD: 1 % (ref 0–1)
BILIRUB SERPL-MCNC: 0.9 MG/DL (ref 0.2–1)
BUN SERPL-MCNC: 18 MG/DL (ref 6–20)
BUN/CREAT SERPL: 21 (ref 12–20)
CALCIUM SERPL-MCNC: 9.4 MG/DL (ref 8.5–10.1)
CALCULATED P AXIS, ECG09: -15 DEGREES
CALCULATED R AXIS, ECG10: 49 DEGREES
CALCULATED T AXIS, ECG11: 26 DEGREES
CHLORIDE SERPL-SCNC: 110 MMOL/L (ref 97–108)
CO2 SERPL-SCNC: 26 MMOL/L (ref 21–32)
COMMENT, HOLDF: NORMAL
CREAT SERPL-MCNC: 0.84 MG/DL (ref 0.7–1.3)
DIAGNOSIS, 93000: NORMAL
DIFFERENTIAL METHOD BLD: ABNORMAL
EOSINOPHIL # BLD: 0.3 K/UL (ref 0–0.4)
EOSINOPHIL NFR BLD: 4 % (ref 0–7)
ERYTHROCYTE [DISTWIDTH] IN BLOOD BY AUTOMATED COUNT: 12.3 % (ref 11.5–14.5)
GLOBULIN SER CALC-MCNC: 3.3 G/DL (ref 2–4)
GLUCOSE SERPL-MCNC: 96 MG/DL (ref 65–100)
HCT VFR BLD AUTO: 36.7 % (ref 36.6–50.3)
HGB BLD-MCNC: 12.4 G/DL (ref 12.1–17)
IMM GRANULOCYTES # BLD AUTO: 0 K/UL (ref 0–0.04)
IMM GRANULOCYTES NFR BLD AUTO: 0 % (ref 0–0.5)
LYMPHOCYTES # BLD: 1.3 K/UL (ref 0.8–3.5)
LYMPHOCYTES NFR BLD: 19 % (ref 12–49)
MCH RBC QN AUTO: 33.1 PG (ref 26–34)
MCHC RBC AUTO-ENTMCNC: 33.8 G/DL (ref 30–36.5)
MCV RBC AUTO: 97.9 FL (ref 80–99)
MONOCYTES # BLD: 0.9 K/UL (ref 0–1)
MONOCYTES NFR BLD: 13 % (ref 5–13)
NEUTS SEG # BLD: 4.3 K/UL (ref 1.8–8)
NEUTS SEG NFR BLD: 63 % (ref 32–75)
NRBC # BLD: 0 K/UL (ref 0–0.01)
NRBC BLD-RTO: 0 PER 100 WBC
P-R INTERVAL, ECG05: 152 MS
PLATELET # BLD AUTO: 190 K/UL (ref 150–400)
PMV BLD AUTO: 9.9 FL (ref 8.9–12.9)
POTASSIUM SERPL-SCNC: 3.8 MMOL/L (ref 3.5–5.1)
PROT SERPL-MCNC: 6.8 G/DL (ref 6.4–8.2)
Q-T INTERVAL, ECG07: 414 MS
QRS DURATION, ECG06: 102 MS
QTC CALCULATION (BEZET), ECG08: 416 MS
RBC # BLD AUTO: 3.75 M/UL (ref 4.1–5.7)
SAMPLES BEING HELD,HOLD: NORMAL
SODIUM SERPL-SCNC: 139 MMOL/L (ref 136–145)
VENTRICULAR RATE, ECG03: 61 BPM
WBC # BLD AUTO: 6.8 K/UL (ref 4.1–11.1)

## 2021-08-24 PROCEDURE — 99285 EMERGENCY DEPT VISIT HI MDM: CPT

## 2021-08-24 PROCEDURE — 93005 ELECTROCARDIOGRAM TRACING: CPT

## 2021-08-24 PROCEDURE — 80053 COMPREHEN METABOLIC PANEL: CPT

## 2021-08-24 PROCEDURE — 85025 COMPLETE CBC W/AUTO DIFF WBC: CPT

## 2021-08-24 PROCEDURE — 74011636637 HC RX REV CODE- 636/637: Performed by: EMERGENCY MEDICINE

## 2021-08-24 PROCEDURE — 36415 COLL VENOUS BLD VENIPUNCTURE: CPT

## 2021-08-24 RX ORDER — ALBUTEROL SULFATE 90 UG/1
2 AEROSOL, METERED RESPIRATORY (INHALATION)
Qty: 1 INHALER | Refills: 11 | Status: SHIPPED | OUTPATIENT
Start: 2021-08-24

## 2021-08-24 RX ORDER — PREDNISONE 20 MG/1
60 TABLET ORAL DAILY
Qty: 15 TABLET | Refills: 0 | Status: SHIPPED | OUTPATIENT
Start: 2021-08-24 | End: 2021-08-29

## 2021-08-24 RX ORDER — AZITHROMYCIN 250 MG/1
TABLET, FILM COATED ORAL
Qty: 6 TABLET | Refills: 0 | Status: SHIPPED | OUTPATIENT
Start: 2021-08-24 | End: 2021-08-29

## 2021-08-24 RX ORDER — PREDNISONE 20 MG/1
60 TABLET ORAL
Status: COMPLETED | OUTPATIENT
Start: 2021-08-24 | End: 2021-08-24

## 2021-08-24 RX ORDER — PREDNISONE 20 MG/1
60 TABLET ORAL DAILY
Qty: 15 TABLET | Refills: 0 | Status: SHIPPED | OUTPATIENT
Start: 2021-08-24 | End: 2021-08-24 | Stop reason: SDUPTHER

## 2021-08-24 RX ORDER — ALBUTEROL SULFATE 90 UG/1
2 AEROSOL, METERED RESPIRATORY (INHALATION)
Qty: 1 INHALER | Refills: 11 | Status: SHIPPED | OUTPATIENT
Start: 2021-08-24 | End: 2021-08-24 | Stop reason: SDUPTHER

## 2021-08-24 RX ORDER — AZITHROMYCIN 250 MG/1
TABLET, FILM COATED ORAL
Qty: 6 TABLET | Refills: 0 | Status: SHIPPED | OUTPATIENT
Start: 2021-08-24 | End: 2021-08-24 | Stop reason: SDUPTHER

## 2021-08-24 RX ADMIN — PREDNISONE 60 MG: 20 TABLET ORAL at 20:30

## 2021-08-24 NOTE — ED TRIAGE NOTES
Triage: Patient sent from [facility] for further evaluation of low O2 sats, cough/congestion. O2 saturation was 91% on room air. Patient denies feeling short of breath. O2 sat 95% on room air in triage. Covid test and CXR negative at [facility].

## 2021-08-24 NOTE — ED NOTES
Verbal shift change report given to Lucille Andrea RN (oncoming nurse) by Isaura Farley RN and Christina Nguyen RN (offgoing nurse). Report included the following information SBAR and ED Summary.

## 2021-08-24 NOTE — ED NOTES
Bedside and Verbal shift change report given to Lara Darden RN (oncoming nurse) by Jesús Luna RN (offgoing nurse). Report included the following information SBAR, ED Summary and MAR.

## 2021-08-24 NOTE — ED PROVIDER NOTES
82-year-old male with a history of asthma, hypertension presents with 10 to 11 days of chest congestion. He went to [facility]. He has a negative Covid test with normal x-ray. He was sent to the emergency department because they told him his oxygen was low. Upon arrival he complains of no shortness of breath. He states that his asthma does act up at times. His blood pressure is high and states that he does take his medications but at random times it is elevated. He denies any fevers or chills. On chart review patient Has a history of cryptogenic shock and sees  and had an implantable loop recorder placed in February. Referral / Consult         Past Medical History:   Diagnosis Date    Asthma     CVA (cerebral vascular accident) ([facility] [address] 2020    High cholesterol     Hypertension     Peptic ulcer     on med- no problems since    TGA (transient global amnesia) 2014    Vertigo 2012       Past Surgical History:   Procedure Laterality Date    ENDOSCOPY, COLON, DIAGNOSTIC      neg    HX HERNIA REPAIR      HX ORTHOPAEDIC      left shoulder 2011    HX TONSILLECTOMY      ME INSERTION SUBQ CARDIAC RHYTHM MONITOR W/PRGRMG N/A 2021    LOOP RECORDER INSERT performed by Bre Anguiano MD at [address] [facility]         Family History:   Problem Relation Age of Onset   Kamala Slater Stroke Mother          CVA age 52's       Social History     Socioeconomic History    Marital status:      Spouse name: Not on file    Number of children: Not on file    Years of education: Not on file    Highest education level: Not on file   Occupational History    Not on file   Tobacco Use    Smoking status: Never Smoker    Smokeless tobacco: Never Used   Substance and Sexual Activity    Alcohol use:  Yes     Alcohol/week: 5.8 standard drinks     Types: 7 drink(s) per week    Drug use: No    Sexual activity: Not on file   Other Topics Concern    Not on file   Social History Narrative    Not on file     Social Determinants of Health     Financial Resource Strain:     Difficulty of Paying Living Expenses:    Food Insecurity:     Worried About Running Out of Food in the Last Year:     [address] in the Last Year:    Transportation Needs:     Lack of Transportation (Medical):  Lack of Transportation (Non-Medical):    Physical Activity:     Days of Exercise per Week:     Minutes of Exercise per Session:    Stress:     Feeling of Stress :    Social Connections:     Frequency of Communication with Friends and Family:     Frequency of Social Gatherings with Friends and Family:     Attends [employer] Services:     Active Member of Clubs or Organizations:     Attends Club or Organization Meetings:     Marital Status:    Intimate Partner Violence:     Fear of Current or Ex-Partner:     Emotionally Abused:     Physically Abused:     Sexually Abused: ALLERGIES: Peanut    Review of Systems   All other systems reviewed and are negative. Vitals:    08/24/21 1113 08/24/21 1900   BP: (!) 200/99 (!) 194/105   Pulse: 73 60   Resp: 20 8   Temp: 98.8 °F (37.1 °C)    SpO2: 95% 96%   Weight: 74.8 kg (165 lb)    Height: 5' 10\" (1.778 m)             Physical Exam  Vitals and nursing note reviewed. Constitutional:       General: He is not in acute distress. HENT:      Head: Normocephalic and atraumatic. Eyes:      General: No scleral icterus. Conjunctiva/sclera: Conjunctivae normal.      Pupils: Pupils are equal, round, and reactive to light. Neck:      Trachea: No tracheal deviation. Cardiovascular:      Rate and Rhythm: Normal rate and regular rhythm. Pulmonary:      Effort: Pulmonary effort is normal. No respiratory distress. Breath sounds: No stridor. Wheezing present. Abdominal:      General: There is no distension. Palpations: Abdomen is soft. Tenderness: There is no abdominal tenderness.    Genitourinary:     Comments: deferred  Musculoskeletal:         General: No deformity. Cervical back: No rigidity. Skin:     General: Skin is warm and dry. Neurological:      General: No focal deficit present. Mental Status: He is alert. Psychiatric:         Mood and Affect: Mood normal.         Behavior: Behavior normal.          MDM  Number of Diagnoses or Management Options  Acute bronchitis, unspecified organism  Diagnosis management comments: Very pleasant 79-year-old gentleman who presents from urgent care with reported hypoxia. He is no longer hypoxic here. His blood pressure is elevated. Advised him to follow-up closely with his PCP for recheck and blood pressure management as needed. His chest x-ray was negative at the outside facility. He also had a negative Covid test.  He is well-appearing. I suspect that he has bronchitis versus an asthma exacerbation. I provided him with a rescue inhaler, prednisone course, antibiotic prescription to be filled if his symptoms do not improve in a couple days. He was advised to follow-up with his primary care doctor. He was given return precautions. ED Course as of Aug 24 2024   Tue Aug 24, 2021   2018 ED EKG interpretation:8:18 PM  Rhythm: normal sinus rhythm; and regular .  Rate (approx.): 61; Axis: normal; P wave: normal; QRS interval: normal ; ST/T wave: normal; Other findings: normal.         [TT]      ED Course User Index  [TT] Melia Castro MD       Procedures

## 2021-08-25 NOTE — ED NOTES
Patient received discharge instructions by MD and RN. Reviewed discharge instructions with patient. Patient verbalized understanding of discharge teaching. Patient left ED via ambulatory.

## 2021-10-01 ENCOUNTER — OFFICE VISIT (OUTPATIENT)
Dept: CARDIOLOGY CLINIC | Age: 76
End: 2021-10-01
Payer: COMMERCIAL

## 2021-10-01 DIAGNOSIS — Z95.818 STATUS POST PLACEMENT OF IMPLANTABLE LOOP RECORDER: Primary | ICD-10-CM

## 2021-10-04 PROCEDURE — 93298 REM INTERROG DEV EVAL SCRMS: CPT | Performed by: INTERNAL MEDICINE

## 2022-03-04 ENCOUNTER — OFFICE VISIT (OUTPATIENT)
Dept: CARDIOLOGY CLINIC | Age: 77
End: 2022-03-04
Payer: COMMERCIAL

## 2022-03-04 DIAGNOSIS — Z95.818 STATUS POST PLACEMENT OF IMPLANTABLE LOOP RECORDER: Primary | ICD-10-CM

## 2022-03-04 PROCEDURE — 93298 REM INTERROG DEV EVAL SCRMS: CPT | Performed by: INTERNAL MEDICINE

## 2022-03-19 PROBLEM — D64.9 ANEMIA: Status: ACTIVE | Noted: 2017-08-02

## 2022-03-19 PROBLEM — Z95.818 STATUS POST PLACEMENT OF IMPLANTABLE LOOP RECORDER: Status: ACTIVE | Noted: 2021-02-26

## 2022-03-19 PROBLEM — I63.9 CVA (CEREBRAL VASCULAR ACCIDENT) (HCC): Status: ACTIVE | Noted: 2020-11-09

## 2022-06-03 ENCOUNTER — OFFICE VISIT (OUTPATIENT)
Dept: CARDIOLOGY CLINIC | Age: 77
End: 2022-06-03
Payer: COMMERCIAL

## 2022-06-03 DIAGNOSIS — Z95.818 STATUS POST PLACEMENT OF IMPLANTABLE LOOP RECORDER: Primary | ICD-10-CM

## 2022-06-03 PROCEDURE — 93298 REM INTERROG DEV EVAL SCRMS: CPT | Performed by: INTERNAL MEDICINE

## 2022-10-28 ENCOUNTER — OFFICE VISIT (OUTPATIENT)
Dept: CARDIOLOGY CLINIC | Age: 77
End: 2022-10-28
Payer: COMMERCIAL

## 2022-10-28 DIAGNOSIS — Z95.818 OTHER SPECIFIED CARDIAC DEVICE IN SITU: Primary | ICD-10-CM

## 2022-10-28 PROCEDURE — 93298 REM INTERROG DEV EVAL SCRMS: CPT | Performed by: INTERNAL MEDICINE

## 2022-10-28 NOTE — PROGRESS NOTES
HISTORY OF PRESENT ILLNESS  Ale Martinez is a 78 y.o. male.     HPI    ROS    Physical Exam    ASSESSMENT and PLAN  {ASSESSMENT/PLAN:88180}

## 2022-10-28 NOTE — PROGRESS NOTES
C/ MDT LINQ REMOTE   Scheduled ILR remote transmission.  No events  Chargeable visit  See scanned documents

## 2022-12-02 ENCOUNTER — OFFICE VISIT (OUTPATIENT)
Dept: CARDIOLOGY CLINIC | Age: 77
End: 2022-12-02
Payer: COMMERCIAL

## 2022-12-02 DIAGNOSIS — Z95.818 PRESENCE OF CARDIAC DEVICE: Primary | ICD-10-CM

## 2022-12-02 NOTE — PROGRESS NOTES
C/ MDT LINQ REMOTE   Scheduled remote transmission. Device functioning appropriately as programmed. See scanned documents.  Chargeable visit

## 2023-03-31 ENCOUNTER — OFFICE VISIT (OUTPATIENT)
Dept: CARDIOLOGY CLINIC | Age: 78
End: 2023-03-31
Payer: COMMERCIAL

## 2023-03-31 PROCEDURE — 93298 REM INTERROG DEV EVAL SCRMS: CPT | Performed by: INTERNAL MEDICINE

## 2023-04-24 ENCOUNTER — HOSPITAL ENCOUNTER (OUTPATIENT)
Dept: RADIATION THERAPY | Age: 78
Discharge: HOME | End: 2023-04-24

## 2023-04-25 DIAGNOSIS — C61 PROSTATE CANCER (HCC): ICD-10-CM

## 2023-05-04 ENCOUNTER — HOSPITAL ENCOUNTER (OUTPATIENT)
Dept: NUCLEAR MEDICINE | Age: 78
End: 2023-05-04
Attending: RADIOLOGY
Payer: MEDICARE

## 2023-05-04 ENCOUNTER — HOSPITAL ENCOUNTER (OUTPATIENT)
Dept: NUCLEAR MEDICINE | Age: 78
Discharge: HOME | End: 2023-05-04
Attending: RADIOLOGY
Payer: MEDICARE

## 2023-05-04 DIAGNOSIS — C61 PROSTATE CANCER (HCC): ICD-10-CM

## 2023-05-04 PROCEDURE — 78306 BONE IMAGING WHOLE BODY: CPT

## 2023-05-24 ENCOUNTER — RESEARCH ENCOUNTER (OUTPATIENT)
Facility: HOSPITAL | Age: 78
End: 2023-05-24

## 2023-05-24 NOTE — PROGRESS NOTES
Pt has been screened for all eligibility/ineligibility criteria and has been determined eligible for this protocol. Informed consent was reviewed by RN with patient prior to signing. Patient was informed that participation is voluntary and he has the right to withdraw at anytime without prejudice. The patient has been deemed of adequate mental and emotional capacity to give an informed consent per Dr. Kierra Smith. Possible side effects were discussed in detail, with patient being advised to inform RN or Dr. Kierra Smith immediately in the event of any side effects once drug is started or at any time should he have any questions. All questions were answered adequately by RN or Dr. Kierra Smith. Patient signed consent today for study NRG-: Two Studies for Patients with Unfavorable Intermediate Risk Prostate Cancer Testing Less Intense Treatment for Patients with a Low Gene Risk Score and Testing a More Intense Treatment for Patients with a Higher Gene Risk Score. A copy of ICF given to patient. No additional questions at this time.

## 2023-06-22 ENCOUNTER — HOSPITAL ENCOUNTER (OUTPATIENT)
Facility: HOSPITAL | Age: 78
Discharge: HOME | End: 2023-06-25
Attending: RADIOLOGY

## 2023-06-26 ENCOUNTER — HOSPITAL ENCOUNTER (OUTPATIENT)
Age: 78
Discharge: HOME | End: 2023-06-29
Payer: MEDICARE

## 2023-06-26 ENCOUNTER — HOSPITAL ENCOUNTER (OUTPATIENT)
Facility: HOSPITAL | Age: 78
Discharge: HOME | End: 2023-06-29
Attending: RADIOLOGY

## 2023-06-26 DIAGNOSIS — C61 PROSTATE CA (HCC): ICD-10-CM

## 2023-06-26 PROCEDURE — 76498 UNLISTED MR PROCEDURE: CPT

## 2023-07-06 ENCOUNTER — HOSPITAL ENCOUNTER (OUTPATIENT)
Facility: HOSPITAL | Age: 78
Discharge: HOME | End: 2023-07-09
Attending: RADIOLOGY

## 2023-07-06 LAB
RAD ONC ARIA COURSE FIRST TREATMENT DATE: NORMAL
RAD ONC ARIA COURSE ID: NORMAL
RAD ONC ARIA COURSE INTENT: NORMAL
RAD ONC ARIA COURSE LAST TREATMENT DATE: NORMAL
RAD ONC ARIA COURSE SESSION NUMBER: 1
RAD ONC ARIA COURSE START DATE: NORMAL
RAD ONC ARIA COURSE TREATMENT ELAPSED DAYS: 0
RAD ONC ARIA PLAN FRACTIONS TREATED TO DATE: 1
RAD ONC ARIA PLAN ID: NORMAL
RAD ONC ARIA PLAN PRESCRIBED DOSE PER FRACTION: 7.25 GY
RAD ONC ARIA PLAN PRIMARY REFERENCE POINT: NORMAL
RAD ONC ARIA PLAN TOTAL FRACTIONS PRESCRIBED: 5
RAD ONC ARIA PLAN TOTAL PRESCRIBED DOSE: 3625 CGY
RAD ONC ARIA REFERENCE POINT DOSAGE GIVEN TO DATE: 7.25 GY
RAD ONC ARIA REFERENCE POINT DOSAGE GIVEN TO DATE: 8 GY
RAD ONC ARIA REFERENCE POINT DOSAGE GIVEN TO DATE: 8.24 GY
RAD ONC ARIA REFERENCE POINT ID: NORMAL
RAD ONC ARIA REFERENCE POINT SESSION DOSAGE GIVEN: 7.25 GY
RAD ONC ARIA REFERENCE POINT SESSION DOSAGE GIVEN: 8 GY
RAD ONC ARIA REFERENCE POINT SESSION DOSAGE GIVEN: 8.24 GY

## 2023-07-06 RX ORDER — TAMSULOSIN HYDROCHLORIDE 0.4 MG/1
0.4 CAPSULE ORAL
Qty: 30 CAPSULE | Refills: 3 | Status: SHIPPED | OUTPATIENT
Start: 2023-07-06

## 2023-07-10 ENCOUNTER — HOSPITAL ENCOUNTER (OUTPATIENT)
Facility: HOSPITAL | Age: 78
Discharge: HOME | End: 2023-07-13
Attending: RADIOLOGY

## 2023-07-10 LAB
RAD ONC ARIA COURSE FIRST TREATMENT DATE: NORMAL
RAD ONC ARIA COURSE ID: NORMAL
RAD ONC ARIA COURSE INTENT: NORMAL
RAD ONC ARIA COURSE LAST TREATMENT DATE: NORMAL
RAD ONC ARIA COURSE SESSION NUMBER: 2
RAD ONC ARIA COURSE START DATE: NORMAL
RAD ONC ARIA COURSE TREATMENT ELAPSED DAYS: 4
RAD ONC ARIA PLAN FRACTIONS TREATED TO DATE: 2
RAD ONC ARIA PLAN ID: NORMAL
RAD ONC ARIA PLAN PRESCRIBED DOSE PER FRACTION: 7.25 GY
RAD ONC ARIA PLAN PRIMARY REFERENCE POINT: NORMAL
RAD ONC ARIA PLAN TOTAL FRACTIONS PRESCRIBED: 5
RAD ONC ARIA PLAN TOTAL PRESCRIBED DOSE: 3625 CGY
RAD ONC ARIA REFERENCE POINT DOSAGE GIVEN TO DATE: 14.5 GY
RAD ONC ARIA REFERENCE POINT DOSAGE GIVEN TO DATE: 16 GY
RAD ONC ARIA REFERENCE POINT DOSAGE GIVEN TO DATE: 16.48 GY
RAD ONC ARIA REFERENCE POINT ID: NORMAL
RAD ONC ARIA REFERENCE POINT SESSION DOSAGE GIVEN: 7.25 GY
RAD ONC ARIA REFERENCE POINT SESSION DOSAGE GIVEN: 8 GY
RAD ONC ARIA REFERENCE POINT SESSION DOSAGE GIVEN: 8.24 GY

## 2023-07-12 ENCOUNTER — HOSPITAL ENCOUNTER (OUTPATIENT)
Facility: HOSPITAL | Age: 78
Discharge: HOME | End: 2023-07-15
Attending: RADIOLOGY

## 2023-07-12 LAB
RAD ONC ARIA COURSE FIRST TREATMENT DATE: NORMAL
RAD ONC ARIA COURSE ID: NORMAL
RAD ONC ARIA COURSE INTENT: NORMAL
RAD ONC ARIA COURSE LAST TREATMENT DATE: NORMAL
RAD ONC ARIA COURSE SESSION NUMBER: 3
RAD ONC ARIA COURSE START DATE: NORMAL
RAD ONC ARIA COURSE TREATMENT ELAPSED DAYS: 6
RAD ONC ARIA PLAN FRACTIONS TREATED TO DATE: 3
RAD ONC ARIA PLAN ID: NORMAL
RAD ONC ARIA PLAN PRESCRIBED DOSE PER FRACTION: 7.25 GY
RAD ONC ARIA PLAN PRIMARY REFERENCE POINT: NORMAL
RAD ONC ARIA PLAN TOTAL FRACTIONS PRESCRIBED: 5
RAD ONC ARIA PLAN TOTAL PRESCRIBED DOSE: 3625 CGY
RAD ONC ARIA REFERENCE POINT DOSAGE GIVEN TO DATE: 21.75 GY
RAD ONC ARIA REFERENCE POINT DOSAGE GIVEN TO DATE: 24 GY
RAD ONC ARIA REFERENCE POINT DOSAGE GIVEN TO DATE: 24.72 GY
RAD ONC ARIA REFERENCE POINT ID: NORMAL
RAD ONC ARIA REFERENCE POINT SESSION DOSAGE GIVEN: 7.25 GY
RAD ONC ARIA REFERENCE POINT SESSION DOSAGE GIVEN: 8 GY
RAD ONC ARIA REFERENCE POINT SESSION DOSAGE GIVEN: 8.24 GY

## 2023-07-14 ENCOUNTER — HOSPITAL ENCOUNTER (OUTPATIENT)
Facility: HOSPITAL | Age: 78
Discharge: HOME | End: 2023-07-17
Attending: RADIOLOGY

## 2023-07-14 LAB
RAD ONC ARIA COURSE FIRST TREATMENT DATE: NORMAL
RAD ONC ARIA COURSE ID: NORMAL
RAD ONC ARIA COURSE INTENT: NORMAL
RAD ONC ARIA COURSE LAST TREATMENT DATE: NORMAL
RAD ONC ARIA COURSE SESSION NUMBER: 4
RAD ONC ARIA COURSE START DATE: NORMAL
RAD ONC ARIA COURSE TREATMENT ELAPSED DAYS: 8
RAD ONC ARIA PLAN FRACTIONS TREATED TO DATE: 4
RAD ONC ARIA PLAN ID: NORMAL
RAD ONC ARIA PLAN PRESCRIBED DOSE PER FRACTION: 7.25 GY
RAD ONC ARIA PLAN PRIMARY REFERENCE POINT: NORMAL
RAD ONC ARIA PLAN TOTAL FRACTIONS PRESCRIBED: 5
RAD ONC ARIA PLAN TOTAL PRESCRIBED DOSE: 3625 CGY
RAD ONC ARIA REFERENCE POINT DOSAGE GIVEN TO DATE: 29 GY
RAD ONC ARIA REFERENCE POINT DOSAGE GIVEN TO DATE: 32 GY
RAD ONC ARIA REFERENCE POINT DOSAGE GIVEN TO DATE: 32.97 GY
RAD ONC ARIA REFERENCE POINT ID: NORMAL
RAD ONC ARIA REFERENCE POINT SESSION DOSAGE GIVEN: 7.25 GY
RAD ONC ARIA REFERENCE POINT SESSION DOSAGE GIVEN: 8 GY
RAD ONC ARIA REFERENCE POINT SESSION DOSAGE GIVEN: 8.24 GY

## 2023-07-18 ENCOUNTER — HOSPITAL ENCOUNTER (OUTPATIENT)
Facility: HOSPITAL | Age: 78
Discharge: HOME | End: 2023-07-21
Attending: RADIOLOGY

## 2023-07-18 LAB
RAD ONC ARIA COURSE FIRST TREATMENT DATE: NORMAL
RAD ONC ARIA COURSE ID: NORMAL
RAD ONC ARIA COURSE INTENT: NORMAL
RAD ONC ARIA COURSE LAST TREATMENT DATE: NORMAL
RAD ONC ARIA COURSE SESSION NUMBER: 5
RAD ONC ARIA COURSE START DATE: NORMAL
RAD ONC ARIA COURSE TREATMENT ELAPSED DAYS: 12
RAD ONC ARIA PLAN FRACTIONS TREATED TO DATE: 5
RAD ONC ARIA PLAN ID: NORMAL
RAD ONC ARIA PLAN PRESCRIBED DOSE PER FRACTION: 7.25 GY
RAD ONC ARIA PLAN PRIMARY REFERENCE POINT: NORMAL
RAD ONC ARIA PLAN TOTAL FRACTIONS PRESCRIBED: 5
RAD ONC ARIA PLAN TOTAL PRESCRIBED DOSE: 3625 CGY
RAD ONC ARIA REFERENCE POINT DOSAGE GIVEN TO DATE: 36.25 GY
RAD ONC ARIA REFERENCE POINT DOSAGE GIVEN TO DATE: 40 GY
RAD ONC ARIA REFERENCE POINT DOSAGE GIVEN TO DATE: 41.21 GY
RAD ONC ARIA REFERENCE POINT ID: NORMAL
RAD ONC ARIA REFERENCE POINT SESSION DOSAGE GIVEN: 7.25 GY
RAD ONC ARIA REFERENCE POINT SESSION DOSAGE GIVEN: 8 GY
RAD ONC ARIA REFERENCE POINT SESSION DOSAGE GIVEN: 8.24 GY

## 2023-09-20 ENCOUNTER — HOSPITAL ENCOUNTER (OUTPATIENT)
Facility: HOSPITAL | Age: 78
Discharge: HOME | End: 2023-09-23

## 2023-09-20 VITALS
HEART RATE: 57 BPM | WEIGHT: 159 LBS | BODY MASS INDEX: 23.55 KG/M2 | DIASTOLIC BLOOD PRESSURE: 72 MMHG | SYSTOLIC BLOOD PRESSURE: 121 MMHG | HEIGHT: 69 IN

## 2023-09-20 DIAGNOSIS — C61 PROSTATE CANCER (HCC): Primary | ICD-10-CM

## 2023-09-20 NOTE — PROGRESS NOTES
Procedure Laterality Date    COLONOSCOPY      neg    HERNIA REPAIR      INSERTION SUBQ CARDIAC RHYTHM MONITOR W/PRGRMG N/A 2021    LOOP RECORDER INSERT performed by Nereida Reed MD at [facility] CATH LAB    ORTHOPEDIC SURGERY      left shoulder 2011    TONSILLECTOMY           FAMILY HISTORY:   Family History   Problem Relation Age of Onset    Stroke Mother          CVA age 52's       SOCIAL HISTORY:   Social History     Occupational History    Not on file   Tobacco Use    Smoking status: Never    Smokeless tobacco: Never   Substance and Sexual Activity    Alcohol use:  Yes     Alcohol/week: 5.8 standard drinks of alcohol    Drug use: No    Sexual activity: Not on file       ALLERGIES/MEDICATIONS:  No Known Allergies  Current Outpatient Medications   Medication Sig Dispense Refill    levocetirizine (XYZAL) 5 MG tablet Take 1 tablet by mouth daily 90 tablet 1    clopidogrel (PLAVIX) 75 MG tablet TAKE 1 TABLET DAILY 90 tablet 0    atorvastatin (LIPITOR) 40 MG tablet Take 1 tablet by mouth daily 90 tablet 3    hydroCHLOROthiazide (HYDRODIURIL) 25 MG tablet Take 1 tablet by mouth daily 90 tablet 3    montelukast (SINGULAIR) 10 MG tablet Take 1 tablet by mouth daily 90 tablet 3    tamsulosin (FLOMAX) 0.4 MG capsule Take 1 capsule by mouth Daily with supper (Patient not taking: Reported on 2023) 30 capsule 3    albuterol sulfate HFA (PROVENTIL;VENTOLIN;PROAIR) 108 (90 Base) MCG/ACT inhaler Inhale 2 puffs into the lungs every 6 hours as needed      butalbital-acetaminophen-caffeine (FIORICET, ESGIC) -40 MG per tablet Take 1-2 tablets by mouth every 6 hours as needed (Patient not taking: Reported on 2023)      cyanocobalamin 500 MCG tablet Take 500 mcg by mouth daily      famotidine (PEPCID) 40 MG tablet Take 40 mg by mouth daily      ferrous sulfate (IRON 325) 325 (65 Fe) MG tablet Take 325 mg by mouth every morning (before breakfast)      fluticasone (FLOVENT HFA) 110 MCG/ACT inhaler

## 2023-10-03 PROBLEM — I63.9 CVA (CEREBRAL VASCULAR ACCIDENT) (HCC): Status: RESOLVED | Noted: 2020-11-09 | Resolved: 2023-10-03

## 2023-10-03 PROBLEM — D69.2 SENILE PURPURA (HCC): Status: ACTIVE | Noted: 2023-10-03

## 2023-10-03 PROBLEM — Z86.73 HISTORY OF CVA (CEREBROVASCULAR ACCIDENT): Status: ACTIVE | Noted: 2023-10-03

## 2023-11-04 PROCEDURE — G2066 INTER DEVC REMOTE 30D: HCPCS | Performed by: INTERNAL MEDICINE

## 2023-11-04 PROCEDURE — 93298 REM INTERROG DEV EVAL SCRMS: CPT | Performed by: INTERNAL MEDICINE

## 2023-12-28 PROCEDURE — 93298 REM INTERROG DEV EVAL SCRMS: CPT | Performed by: INTERNAL MEDICINE

## 2024-01-17 ENCOUNTER — HOSPITAL ENCOUNTER (OUTPATIENT)
Facility: HOSPITAL | Age: 79
Discharge: HOME | End: 2024-01-20

## 2024-01-17 VITALS
BODY MASS INDEX: 23.55 KG/M2 | HEIGHT: 69 IN | DIASTOLIC BLOOD PRESSURE: 80 MMHG | HEART RATE: 64 BPM | WEIGHT: 159 LBS | SYSTOLIC BLOOD PRESSURE: 128 MMHG

## 2024-01-17 DIAGNOSIS — C61 PROSTATE CANCER (HCC): Primary | ICD-10-CM

## 2024-01-17 ASSESSMENT — PAIN SCALES - GENERAL: PAINLEVEL_OUTOF10: 0

## 2024-01-17 NOTE — CONSULTS
zone MRI lesion, and Bren 3+3 present in 5% of a core from the right apex medial. He had a cancer talk with Dr Rasmussen and I reviewed the note.   He was accompanied to his consult by his wife. His urinary symptoms are mild with IPSS = 3, QOL = 2/mostly satisfied. PETER = 24. His last colonoscopy was 2 to 5 years ago and he is not due he states.      PHYSICAL EXAM:     Vital Signs for this encounter:  BSA: 1.87 meters squared  /80   Pulse 64   Ht 1.753 m (5' 9.02\")   Wt 72.1 kg (159 lb)   BMI 23.47 kg/m²   Performance status: ECOG 0, fully active, able to carry on all predisease activities without restrictions  Constitutional: No evidence of impaired alertness, uncooperativeness, developmental delays, altered mood or affect, or disorientation.  Head: Normocephalic, atraumatic  Eyes: No evidence of conjunctivitis or scleral abnormalities.  Skin: No evidence of blistering or rash.  Cardiovascular: No evidence of abnormal heart rate.  Respiratory: Breathing unlabored on room air without accessory muscle use.  Musculoskeletal: Normal muscle bulk. No deformity.  Neurologic: Grossly intact. Normal gait.  Psychiatric: Appropriate mood and affect. Good judgment and insight.  Hematologic/Lymphatic: No evidence of petechiae / purpura / ecchymosis.        Impression    Unfavorable intermediate risk prostate cancer, Bren 3 + 4, PSA 4.14, cT2b. Definitive radiation with prostate SBRT, 7.25 Gy x5, with CTV 8 Gy x 5, completed 7/18/2023.  Clinical trial volunteer for NRG , Decipher 0.17 low, randomized to Arm 1: RT alone.  [facility] chart #[id number]    Approximately 6 months since the completion of definitive radiation.     Good PSA response. Continue to follow PSA.     Excellent resolution from acute symptoms of radiation. Some ongoing diarrhea that may continue to improve with time.    Plan:    - See Dr. Rasmussen as planned in November.  - Return to this clinic 5/29/24, then 5/28/25, or sooner as needed.

## 2024-03-27 ENCOUNTER — RESEARCH ENCOUNTER (OUTPATIENT)
Facility: HOSPITAL | Age: 79
End: 2024-03-27

## 2024-03-27 NOTE — PROGRESS NOTES
Spoke with patient today via phone 3/27/2024 for Month 9 follow up on research protocol NRG-.     Patient confirmed baseline AEs are ongoing and unchanged including, hypertension, nocturia, and urinary urgency.    Patient states episodes of diarrhea are infrequent and are quickly resolved with imodium. The longest diarrhea has lasted recently is half of a day.     Patient also reports some fatigue. Patient states still very active but feels like the \"get up and go\" is not what it used to be.     Patient has no questions or concerns at this time and is aware of follow up labs and provider appointments.

## 2024-04-09 ENCOUNTER — HOSPITAL ENCOUNTER (OUTPATIENT)
Facility: HOSPITAL | Age: 79
Discharge: HOME | End: 2024-04-12
Payer: MEDICARE

## 2024-04-09 DIAGNOSIS — R05.2 SUBACUTE COUGH: ICD-10-CM

## 2024-04-09 PROCEDURE — 71046 X-RAY EXAM CHEST 2 VIEWS: CPT

## 2024-06-12 ENCOUNTER — HOSPITAL ENCOUNTER (OUTPATIENT)
Facility: HOSPITAL | Age: 79
Discharge: HOME | End: 2024-06-15

## 2024-06-12 VITALS
HEIGHT: 69 IN | BODY MASS INDEX: 23.7 KG/M2 | HEART RATE: 59 BPM | DIASTOLIC BLOOD PRESSURE: 78 MMHG | WEIGHT: 160 LBS | SYSTOLIC BLOOD PRESSURE: 133 MMHG

## 2024-06-12 DIAGNOSIS — C61 PROSTATE CANCER (HCC): Primary | ICD-10-CM

## 2024-06-12 ASSESSMENT — PAIN SCALES - GENERAL: PAINLEVEL_OUTOF10: 0

## 2024-06-12 NOTE — CONSULTS
RADIATION ONCOLOGY PROGRESS NOTE    Patient Name: Juvenal Parra  Patient YOB: 1945   Medical Record Number: [id number]  Referring Physician: Stone Rasmussen MD  [address] [id number]  Primary Care Provider: RUMA Parrish MD    DIAGNOSIS & STAGING:  Cancer Staging   Prostate cancer (HCC)  Staging form: Prostate, AJCC 8th Edition  - Clinical stage from 3/2/2023: Stage IIB (cT2b, cN0, cM0, PSA: 4.1, Grade Group: 2) - Unsigned      ICD-10-CM    1. Prostate cancer (HCC)  C61         AJCC Staging has been reviewed      CHIEF COMPLAINT: Unfavorable intermediate risk prostate cancer, Bren 3 + 4, PSA 4.14, cT2b. Definitive radiation with prostate SBRT, 7.25 Gy x5, with CTV 8 Gy x 5, completed 7/18/2023.  Clinical trial volunteer for NRG , Decipher 0.17 low, randomized to Arm 1: RT alone.  [facility] chart #[id number]    RADIATION HISTORY:    Treatment Summary:  Course: C1  Treatment Site Ref. ID Energy Dose/Fx (cGy) #Fx Dose Correction (cGy) Total Dose (cGy) Start Date End Date Elapsed Days   [id number] 6X 725 5 / 5 0 3,625 7/6/2023 7/18/2023 12        RETURN VISITS:    7/6/2023: Seen today after his first fraction of prostate SBRT. We reviewed the expected short-term side effects of radiation.  We reviewed the follow-up plan.  He had 3 days of tamsulosin around the time of his biopsy.  He denies any side effects that he noticed from that.  I will write him tamsulosin in anticipation of urinary symptoms.  We reviewed the appropriate use of this medication and precautions around it, including the transient hypotension sometimes experienced with the first few doses, how to identify this and manage it if so.  He and his wife have their young grandkids this weekend, and he asked about any radiation safety precautions.  This is x-ray treatment, he is not radioactive, and he does not have to have any radiation precautions around the children.  All

## 2024-10-01 ENCOUNTER — APPOINTMENT (OUTPATIENT)
Facility: HOSPITAL | Age: 79
DRG: 871 | End: 2024-10-01
Payer: MEDICARE

## 2024-10-01 ENCOUNTER — HOSPITAL ENCOUNTER (INPATIENT)
Facility: HOSPITAL | Age: 79
LOS: 1 days | Discharge: HOME | DRG: 871 | End: 2024-10-03
Attending: STUDENT IN AN ORGANIZED HEALTH CARE EDUCATION/TRAINING PROGRAM | Admitting: INTERNAL MEDICINE
Payer: MEDICARE

## 2024-10-01 DIAGNOSIS — R39.9 UTI SYMPTOMS: ICD-10-CM

## 2024-10-01 DIAGNOSIS — R31.9 HEMATURIA, UNSPECIFIED TYPE: ICD-10-CM

## 2024-10-01 DIAGNOSIS — N13.30 HYDRONEPHROSIS, UNSPECIFIED HYDRONEPHROSIS TYPE: ICD-10-CM

## 2024-10-01 DIAGNOSIS — J18.9 PNEUMONIA OF RIGHT MIDDLE LOBE DUE TO INFECTIOUS ORGANISM: ICD-10-CM

## 2024-10-01 DIAGNOSIS — R93.89 ABNORMAL CXR: ICD-10-CM

## 2024-10-01 DIAGNOSIS — R65.20 SEVERE SEPSIS (HCC): Primary | ICD-10-CM

## 2024-10-01 DIAGNOSIS — A41.9 SEVERE SEPSIS (HCC): Primary | ICD-10-CM

## 2024-10-01 LAB
ALBUMIN SERPL-MCNC: 3.7 G/DL (ref 3.5–5)
ALBUMIN/GLOB SERPL: 1.1 (ref 1.1–2.2)
ALP SERPL-CCNC: 69 U/L (ref 45–117)
ALT SERPL-CCNC: 17 U/L (ref 12–78)
ANION GAP SERPL CALC-SCNC: 12 MMOL/L (ref 2–12)
AST SERPL-CCNC: 13 U/L (ref 15–37)
BASOPHILS # BLD: 0 K/UL (ref 0–0.1)
BASOPHILS NFR BLD: 0 % (ref 0–1)
BILIRUB SERPL-MCNC: 0.2 MG/DL (ref 0.2–1)
BUN SERPL-MCNC: 11 MG/DL (ref 6–20)
BUN/CREAT SERPL: 12 (ref 12–20)
CALCIUM SERPL-MCNC: 9.2 MG/DL (ref 8.5–10.1)
CHLORIDE SERPL-SCNC: 108 MMOL/L (ref 97–108)
CO2 SERPL-SCNC: 17 MMOL/L (ref 21–32)
COMMENT:: NORMAL
CREAT SERPL-MCNC: 0.91 MG/DL (ref 0.7–1.3)
DIFFERENTIAL METHOD BLD: ABNORMAL
EOSINOPHIL # BLD: 0 K/UL (ref 0–0.4)
EOSINOPHIL NFR BLD: 0 % (ref 0–7)
ERYTHROCYTE [DISTWIDTH] IN BLOOD BY AUTOMATED COUNT: 12.9 % (ref 11.5–14.5)
GLOBULIN SER CALC-MCNC: 3.3 G/DL (ref 2–4)
GLUCOSE SERPL-MCNC: 153 MG/DL (ref 65–100)
HCT VFR BLD AUTO: 37.8 % (ref 36.6–50.3)
HGB BLD-MCNC: 12.7 G/DL (ref 12.1–17)
IMM GRANULOCYTES # BLD AUTO: 0 K/UL
IMM GRANULOCYTES NFR BLD AUTO: 0 %
LACTATE SERPL-SCNC: 2.3 MMOL/L (ref 0.4–2)
LYMPHOCYTES # BLD: 5.3 K/UL (ref 0.8–3.5)
LYMPHOCYTES NFR BLD: 34 % (ref 12–49)
MCH RBC QN AUTO: 28.9 PG (ref 26–34)
MCHC RBC AUTO-ENTMCNC: 33.6 G/DL (ref 30–36.5)
MCV RBC AUTO: 85.9 FL (ref 80–99)
MONOCYTES # BLD: 0.8 K/UL (ref 0–1)
MONOCYTES NFR BLD: 5 % (ref 5–13)
NEUTS SEG # BLD: 9.4 K/UL (ref 1.8–8)
NEUTS SEG NFR BLD: 61 % (ref 32–75)
NRBC # BLD: 0 K/UL (ref 0–0.01)
NRBC BLD-RTO: 0 PER 100 WBC
PLATELET # BLD AUTO: 437 K/UL (ref 150–400)
PLATELET COMMENT: ABNORMAL
PMV BLD AUTO: 9.4 FL (ref 8.9–12.9)
POTASSIUM SERPL-SCNC: 3.4 MMOL/L (ref 3.5–5.1)
PROT SERPL-MCNC: 7 G/DL (ref 6.4–8.2)
RBC # BLD AUTO: 4.4 M/UL (ref 4.1–5.7)
RBC MORPH BLD: ABNORMAL
SODIUM SERPL-SCNC: 137 MMOL/L (ref 136–145)
SPECIMEN HOLD: NORMAL
WBC # BLD AUTO: 15.5 K/UL (ref 4.1–11.1)
WBC MORPH BLD: ABNORMAL

## 2024-10-01 PROCEDURE — 93005 ELECTROCARDIOGRAM TRACING: CPT | Performed by: FAMILY MEDICINE

## 2024-10-01 PROCEDURE — 6360000004 HC RX CONTRAST MEDICATION: Performed by: STUDENT IN AN ORGANIZED HEALTH CARE EDUCATION/TRAINING PROGRAM

## 2024-10-01 PROCEDURE — 87086 URINE CULTURE/COLONY COUNT: CPT

## 2024-10-01 PROCEDURE — 85025 COMPLETE CBC W/AUTO DIFF WBC: CPT

## 2024-10-01 PROCEDURE — 99285 EMERGENCY DEPT VISIT HI MDM: CPT

## 2024-10-01 PROCEDURE — 80053 COMPREHEN METABOLIC PANEL: CPT

## 2024-10-01 PROCEDURE — 87040 BLOOD CULTURE FOR BACTERIA: CPT

## 2024-10-01 PROCEDURE — 83605 ASSAY OF LACTIC ACID: CPT

## 2024-10-01 PROCEDURE — 36415 COLL VENOUS BLD VENIPUNCTURE: CPT

## 2024-10-01 PROCEDURE — 74177 CT ABD & PELVIS W/CONTRAST: CPT

## 2024-10-01 RX ORDER — SODIUM CHLORIDE, SODIUM LACTATE, POTASSIUM CHLORIDE, AND CALCIUM CHLORIDE .6; .31; .03; .02 G/100ML; G/100ML; G/100ML; G/100ML
30 INJECTION, SOLUTION INTRAVENOUS ONCE
Status: COMPLETED | OUTPATIENT
Start: 2024-10-01 | End: 2024-10-02

## 2024-10-01 RX ORDER — IOPAMIDOL 755 MG/ML
100 INJECTION, SOLUTION INTRAVASCULAR
Status: COMPLETED | OUTPATIENT
Start: 2024-10-01 | End: 2024-10-01

## 2024-10-01 RX ADMIN — IOPAMIDOL 100 ML: 755 INJECTION, SOLUTION INTRAVENOUS at 23:50

## 2024-10-01 ASSESSMENT — PAIN DESCRIPTION - FREQUENCY: FREQUENCY: CONTINUOUS

## 2024-10-01 ASSESSMENT — PAIN SCALES - GENERAL: PAINLEVEL_OUTOF10: 3

## 2024-10-01 ASSESSMENT — PAIN DESCRIPTION - ONSET: ONSET: ON-GOING

## 2024-10-01 ASSESSMENT — PAIN DESCRIPTION - PAIN TYPE: TYPE: ACUTE PAIN

## 2024-10-01 ASSESSMENT — PAIN DESCRIPTION - DESCRIPTORS: DESCRIPTORS: ACHING

## 2024-10-01 ASSESSMENT — PAIN DESCRIPTION - LOCATION: LOCATION: FLANK

## 2024-10-01 ASSESSMENT — PAIN - FUNCTIONAL ASSESSMENT
PAIN_FUNCTIONAL_ASSESSMENT: ACTIVITIES ARE NOT PREVENTED
PAIN_FUNCTIONAL_ASSESSMENT: 0-10

## 2024-10-02 ENCOUNTER — APPOINTMENT (OUTPATIENT)
Facility: HOSPITAL | Age: 79
DRG: 871 | End: 2024-10-02
Payer: MEDICARE

## 2024-10-02 PROBLEM — A41.9 SEPSIS (HCC): Status: ACTIVE | Noted: 2024-10-02

## 2024-10-02 LAB
ALBUMIN SERPL-MCNC: 2.8 G/DL (ref 3.5–5)
ALBUMIN/GLOB SERPL: 0.9 (ref 1.1–2.2)
ALP SERPL-CCNC: 104 U/L (ref 45–117)
ALT SERPL-CCNC: 35 U/L (ref 12–78)
ANION GAP SERPL CALC-SCNC: 4 MMOL/L (ref 2–12)
APPEARANCE UR: CLEAR
AST SERPL-CCNC: 29 U/L (ref 15–37)
BACTERIA URNS QL MICRO: NEGATIVE /HPF
BASOPHILS # BLD: 0 K/UL (ref 0–0.1)
BASOPHILS NFR BLD: 0 % (ref 0–1)
BILIRUB SERPL-MCNC: 0.8 MG/DL (ref 0.2–1)
BILIRUB UR QL: NEGATIVE
BUN SERPL-MCNC: 12 MG/DL (ref 6–20)
BUN/CREAT SERPL: 12 (ref 12–20)
CALCIUM SERPL-MCNC: 9.5 MG/DL (ref 8.5–10.1)
CHLORIDE SERPL-SCNC: 106 MMOL/L (ref 97–108)
CO2 SERPL-SCNC: 27 MMOL/L (ref 21–32)
COLOR UR: ABNORMAL
COMMENT:: NORMAL
CREAT SERPL-MCNC: 1.02 MG/DL (ref 0.7–1.3)
DIFFERENTIAL METHOD BLD: ABNORMAL
EKG ATRIAL RATE: 120 BPM
EKG DIAGNOSIS: NORMAL
EKG P AXIS: 51 DEGREES
EKG P-R INTERVAL: 138 MS
EKG Q-T INTERVAL: 330 MS
EKG QRS DURATION: 92 MS
EKG QTC CALCULATION (BAZETT): 466 MS
EKG R AXIS: 50 DEGREES
EKG T AXIS: 21 DEGREES
EKG VENTRICULAR RATE: 120 BPM
EOSINOPHIL # BLD: 0.1 K/UL (ref 0–0.4)
EOSINOPHIL NFR BLD: 1 % (ref 0–7)
EPITH CASTS URNS QL MICRO: ABNORMAL /LPF
ERYTHROCYTE [DISTWIDTH] IN BLOOD BY AUTOMATED COUNT: 12.3 % (ref 11.5–14.5)
ERYTHROCYTE [DISTWIDTH] IN BLOOD BY AUTOMATED COUNT: 12.6 % (ref 11.5–14.5)
EST. AVERAGE GLUCOSE BLD GHB EST-MCNC: 114 MG/DL
GLOBULIN SER CALC-MCNC: 3.1 G/DL (ref 2–4)
GLUCOSE SERPL-MCNC: 102 MG/DL (ref 65–100)
GLUCOSE UR STRIP.AUTO-MCNC: NEGATIVE MG/DL
HBA1C MFR BLD: 5.6 % (ref 4–5.6)
HCT VFR BLD AUTO: 28 % (ref 36.6–50.3)
HCT VFR BLD AUTO: 30.3 % (ref 36.6–50.3)
HGB BLD-MCNC: 10.3 G/DL (ref 12.1–17)
HGB BLD-MCNC: 9.5 G/DL (ref 12.1–17)
HGB UR QL STRIP: ABNORMAL
HYALINE CASTS URNS QL MICRO: ABNORMAL /LPF (ref 0–5)
IMM GRANULOCYTES # BLD AUTO: 0 K/UL (ref 0–0.04)
IMM GRANULOCYTES NFR BLD AUTO: 0 % (ref 0–0.5)
KETONES UR QL STRIP.AUTO: NEGATIVE MG/DL
LACTATE SERPL-SCNC: 0.6 MMOL/L (ref 0.4–2)
LACTATE SERPL-SCNC: 1.1 MMOL/L (ref 0.4–2)
LEUKOCYTE ESTERASE UR QL STRIP.AUTO: NEGATIVE
LIPASE SERPL-CCNC: 39 U/L (ref 13–75)
LYMPHOCYTES # BLD: 0.8 K/UL (ref 0.8–3.5)
LYMPHOCYTES NFR BLD: 11 % (ref 12–49)
MAGNESIUM SERPL-MCNC: 1.7 MG/DL (ref 1.6–2.4)
MCH RBC QN AUTO: 32.2 PG (ref 26–34)
MCH RBC QN AUTO: 32.9 PG (ref 26–34)
MCHC RBC AUTO-ENTMCNC: 33.9 G/DL (ref 30–36.5)
MCHC RBC AUTO-ENTMCNC: 34 G/DL (ref 30–36.5)
MCV RBC AUTO: 94.9 FL (ref 80–99)
MCV RBC AUTO: 96.8 FL (ref 80–99)
MONOCYTES # BLD: 1.1 K/UL (ref 0–1)
MONOCYTES NFR BLD: 16 % (ref 5–13)
NEUTS SEG # BLD: 5.2 K/UL (ref 1.8–8)
NEUTS SEG NFR BLD: 72 % (ref 32–75)
NITRITE UR QL STRIP.AUTO: NEGATIVE
NRBC # BLD: 0 K/UL (ref 0–0.01)
NRBC # BLD: 0 K/UL (ref 0–0.01)
NRBC BLD-RTO: 0 PER 100 WBC
NRBC BLD-RTO: 0 PER 100 WBC
PH UR STRIP: 6.5 (ref 5–8)
PHOSPHATE SERPL-MCNC: 2.8 MG/DL (ref 2.6–4.7)
PLATELET # BLD AUTO: 153 K/UL (ref 150–400)
PLATELET # BLD AUTO: 156 K/UL (ref 150–400)
PMV BLD AUTO: 10 FL (ref 8.9–12.9)
PMV BLD AUTO: 9.9 FL (ref 8.9–12.9)
POTASSIUM SERPL-SCNC: 3.3 MMOL/L (ref 3.5–5.1)
PROT SERPL-MCNC: 5.9 G/DL (ref 6.4–8.2)
PROT UR STRIP-MCNC: NEGATIVE MG/DL
RBC # BLD AUTO: 2.95 M/UL (ref 4.1–5.7)
RBC # BLD AUTO: 3.13 M/UL (ref 4.1–5.7)
RBC #/AREA URNS HPF: ABNORMAL /HPF (ref 0–5)
SARS-COV-2 RNA RESP QL NAA+PROBE: NOT DETECTED
SODIUM SERPL-SCNC: 137 MMOL/L (ref 136–145)
SOURCE: NORMAL
SP GR UR REFRACTOMETRY: 1.02 (ref 1–1.03)
SPECIMEN HOLD: NORMAL
SPECIMEN HOLD: NORMAL
TROPONIN I SERPL HS-MCNC: 10 NG/L (ref 0–76)
TROPONIN I SERPL HS-MCNC: 12 NG/L (ref 0–76)
TSH SERPL DL<=0.05 MIU/L-ACNC: 1.97 UIU/ML (ref 0.36–3.74)
UROBILINOGEN UR QL STRIP.AUTO: 0.2 EU/DL (ref 0.2–1)
WBC # BLD AUTO: 6.9 K/UL (ref 4.1–11.1)
WBC # BLD AUTO: 7.2 K/UL (ref 4.1–11.1)
WBC URNS QL MICRO: ABNORMAL /HPF (ref 0–4)

## 2024-10-02 PROCEDURE — 2500000003 HC RX 250 WO HCPCS: Performed by: INTERNAL MEDICINE

## 2024-10-02 PROCEDURE — 96365 THER/PROPH/DIAG IV INF INIT: CPT

## 2024-10-02 PROCEDURE — 87635 SARS-COV-2 COVID-19 AMP PRB: CPT

## 2024-10-02 PROCEDURE — 83605 ASSAY OF LACTIC ACID: CPT

## 2024-10-02 PROCEDURE — 83690 ASSAY OF LIPASE: CPT

## 2024-10-02 PROCEDURE — 96361 HYDRATE IV INFUSION ADD-ON: CPT

## 2024-10-02 PROCEDURE — 83036 HEMOGLOBIN GLYCOSYLATED A1C: CPT

## 2024-10-02 PROCEDURE — 74018 RADEX ABDOMEN 1 VIEW: CPT

## 2024-10-02 PROCEDURE — 85025 COMPLETE CBC W/AUTO DIFF WBC: CPT

## 2024-10-02 PROCEDURE — 2060000000 HC ICU INTERMEDIATE R&B

## 2024-10-02 PROCEDURE — 84443 ASSAY THYROID STIM HORMONE: CPT

## 2024-10-02 PROCEDURE — 6370000000 HC RX 637 (ALT 250 FOR IP): Performed by: INTERNAL MEDICINE

## 2024-10-02 PROCEDURE — 71046 X-RAY EXAM CHEST 2 VIEWS: CPT

## 2024-10-02 PROCEDURE — 83735 ASSAY OF MAGNESIUM: CPT

## 2024-10-02 PROCEDURE — 36415 COLL VENOUS BLD VENIPUNCTURE: CPT

## 2024-10-02 PROCEDURE — 84484 ASSAY OF TROPONIN QUANT: CPT

## 2024-10-02 PROCEDURE — 84100 ASSAY OF PHOSPHORUS: CPT

## 2024-10-02 PROCEDURE — 80053 COMPREHEN METABOLIC PANEL: CPT

## 2024-10-02 PROCEDURE — 2580000003 HC RX 258: Performed by: INTERNAL MEDICINE

## 2024-10-02 PROCEDURE — 2580000003 HC RX 258: Performed by: STUDENT IN AN ORGANIZED HEALTH CARE EDUCATION/TRAINING PROGRAM

## 2024-10-02 PROCEDURE — 81001 URINALYSIS AUTO W/SCOPE: CPT

## 2024-10-02 PROCEDURE — 71250 CT THORAX DX C-: CPT

## 2024-10-02 PROCEDURE — 6370000000 HC RX 637 (ALT 250 FOR IP): Performed by: NURSE PRACTITIONER

## 2024-10-02 PROCEDURE — 96375 TX/PRO/DX INJ NEW DRUG ADDON: CPT

## 2024-10-02 PROCEDURE — 6360000002 HC RX W HCPCS: Performed by: STUDENT IN AN ORGANIZED HEALTH CARE EDUCATION/TRAINING PROGRAM

## 2024-10-02 PROCEDURE — 85027 COMPLETE CBC AUTOMATED: CPT

## 2024-10-02 PROCEDURE — 2500000003 HC RX 250 WO HCPCS

## 2024-10-02 RX ORDER — SODIUM CHLORIDE, SODIUM LACTATE, POTASSIUM CHLORIDE, CALCIUM CHLORIDE 600; 310; 30; 20 MG/100ML; MG/100ML; MG/100ML; MG/100ML
INJECTION, SOLUTION INTRAVENOUS CONTINUOUS
Status: DISCONTINUED | OUTPATIENT
Start: 2024-10-02 | End: 2024-10-03

## 2024-10-02 RX ORDER — MORPHINE SULFATE 2 MG/ML
2 INJECTION, SOLUTION INTRAMUSCULAR; INTRAVENOUS EVERY 4 HOURS PRN
Status: DISCONTINUED | OUTPATIENT
Start: 2024-10-02 | End: 2024-10-03 | Stop reason: HOSPADM

## 2024-10-02 RX ORDER — POTASSIUM CHLORIDE 750 MG/1
40 TABLET, EXTENDED RELEASE ORAL ONCE
Status: COMPLETED | OUTPATIENT
Start: 2024-10-02 | End: 2024-10-02

## 2024-10-02 RX ORDER — FAMOTIDINE 20 MG/1
40 TABLET, FILM COATED ORAL DAILY
Status: DISCONTINUED | OUTPATIENT
Start: 2024-10-02 | End: 2024-10-03 | Stop reason: HOSPADM

## 2024-10-02 RX ORDER — ENOXAPARIN SODIUM 100 MG/ML
40 INJECTION SUBCUTANEOUS DAILY
Status: DISCONTINUED | OUTPATIENT
Start: 2024-10-02 | End: 2024-10-03 | Stop reason: HOSPADM

## 2024-10-02 RX ORDER — CLOPIDOGREL BISULFATE 75 MG/1
75 TABLET ORAL DAILY
Status: DISCONTINUED | OUTPATIENT
Start: 2024-10-02 | End: 2024-10-03 | Stop reason: HOSPADM

## 2024-10-02 RX ORDER — SODIUM CHLORIDE 0.9 % (FLUSH) 0.9 %
5-40 SYRINGE (ML) INJECTION PRN
Status: DISCONTINUED | OUTPATIENT
Start: 2024-10-02 | End: 2024-10-03 | Stop reason: HOSPADM

## 2024-10-02 RX ORDER — ACETAMINOPHEN 650 MG/1
650 SUPPOSITORY RECTAL EVERY 6 HOURS PRN
Status: DISCONTINUED | OUTPATIENT
Start: 2024-10-02 | End: 2024-10-03 | Stop reason: HOSPADM

## 2024-10-02 RX ORDER — ALBUTEROL SULFATE 0.83 MG/ML
2.5 SOLUTION RESPIRATORY (INHALATION) EVERY 4 HOURS PRN
Status: DISCONTINUED | OUTPATIENT
Start: 2024-10-02 | End: 2024-10-03 | Stop reason: HOSPADM

## 2024-10-02 RX ORDER — ACETAMINOPHEN 325 MG/1
650 TABLET ORAL EVERY 6 HOURS PRN
Status: DISCONTINUED | OUTPATIENT
Start: 2024-10-02 | End: 2024-10-03 | Stop reason: HOSPADM

## 2024-10-02 RX ORDER — SODIUM CHLORIDE 9 MG/ML
INJECTION, SOLUTION INTRAVENOUS PRN
Status: DISCONTINUED | OUTPATIENT
Start: 2024-10-02 | End: 2024-10-03 | Stop reason: HOSPADM

## 2024-10-02 RX ORDER — FERROUS SULFATE 325(65) MG
325 TABLET ORAL
Status: DISCONTINUED | OUTPATIENT
Start: 2024-10-02 | End: 2024-10-03 | Stop reason: HOSPADM

## 2024-10-02 RX ORDER — OXYCODONE HYDROCHLORIDE 5 MG/1
5 TABLET ORAL EVERY 4 HOURS PRN
Status: DISCONTINUED | OUTPATIENT
Start: 2024-10-02 | End: 2024-10-03 | Stop reason: HOSPADM

## 2024-10-02 RX ORDER — ATORVASTATIN CALCIUM 40 MG/1
40 TABLET, FILM COATED ORAL NIGHTLY
Status: DISCONTINUED | OUTPATIENT
Start: 2024-10-02 | End: 2024-10-03 | Stop reason: HOSPADM

## 2024-10-02 RX ORDER — GUAIFENESIN 200 MG/10ML
200 LIQUID ORAL EVERY 6 HOURS PRN
Status: DISCONTINUED | OUTPATIENT
Start: 2024-10-02 | End: 2024-10-03 | Stop reason: HOSPADM

## 2024-10-02 RX ORDER — MONTELUKAST SODIUM 10 MG/1
10 TABLET ORAL DAILY
Status: DISCONTINUED | OUTPATIENT
Start: 2024-10-02 | End: 2024-10-03 | Stop reason: HOSPADM

## 2024-10-02 RX ORDER — SODIUM CHLORIDE 0.9 % (FLUSH) 0.9 %
5-40 SYRINGE (ML) INJECTION EVERY 12 HOURS SCHEDULED
Status: DISCONTINUED | OUTPATIENT
Start: 2024-10-02 | End: 2024-10-03 | Stop reason: HOSPADM

## 2024-10-02 RX ORDER — ALBUTEROL SULFATE 90 UG/1
2 INHALANT RESPIRATORY (INHALATION) EVERY 4 HOURS PRN
Status: DISCONTINUED | OUTPATIENT
Start: 2024-10-02 | End: 2024-10-02 | Stop reason: CLARIF

## 2024-10-02 RX ADMIN — OXYCODONE 5 MG: 5 TABLET ORAL at 09:46

## 2024-10-02 RX ADMIN — DOXYCYCLINE 100 MG: 100 INJECTION, POWDER, LYOPHILIZED, FOR SOLUTION INTRAVENOUS at 22:05

## 2024-10-02 RX ADMIN — WATER 1000 MG: 1 INJECTION INTRAMUSCULAR; INTRAVENOUS; SUBCUTANEOUS at 00:57

## 2024-10-02 RX ADMIN — DOXYCYCLINE 100 MG: 100 INJECTION, POWDER, LYOPHILIZED, FOR SOLUTION INTRAVENOUS at 08:22

## 2024-10-02 RX ADMIN — SODIUM CHLORIDE, POTASSIUM CHLORIDE, SODIUM LACTATE AND CALCIUM CHLORIDE: 600; 310; 30; 20 INJECTION, SOLUTION INTRAVENOUS at 08:21

## 2024-10-02 RX ADMIN — SODIUM CHLORIDE, PRESERVATIVE FREE 10 ML: 5 INJECTION INTRAVENOUS at 08:22

## 2024-10-02 RX ADMIN — SODIUM CHLORIDE, POTASSIUM CHLORIDE, SODIUM LACTATE AND CALCIUM CHLORIDE 2322 ML: 600; 310; 30; 20 INJECTION, SOLUTION INTRAVENOUS at 00:58

## 2024-10-02 RX ADMIN — MONTELUKAST 10 MG: 10 TABLET, FILM COATED ORAL at 08:18

## 2024-10-02 RX ADMIN — ATORVASTATIN CALCIUM 40 MG: 40 TABLET, FILM COATED ORAL at 22:05

## 2024-10-02 RX ADMIN — GUAIFENESIN 200 MG: 200 SOLUTION ORAL at 22:04

## 2024-10-02 RX ADMIN — AZITHROMYCIN MONOHYDRATE 500 MG: 500 INJECTION, POWDER, LYOPHILIZED, FOR SOLUTION INTRAVENOUS at 03:44

## 2024-10-02 RX ADMIN — OXYCODONE 5 MG: 5 TABLET ORAL at 16:39

## 2024-10-02 RX ADMIN — SODIUM CHLORIDE, PRESERVATIVE FREE 10 ML: 5 INJECTION INTRAVENOUS at 22:14

## 2024-10-02 RX ADMIN — CLOPIDOGREL BISULFATE 75 MG: 75 TABLET ORAL at 08:18

## 2024-10-02 RX ADMIN — POTASSIUM CHLORIDE 40 MEQ: 750 TABLET, EXTENDED RELEASE ORAL at 16:36

## 2024-10-02 RX ADMIN — FERROUS SULFATE TAB 325 MG (65 MG ELEMENTAL FE) 325 MG: 325 (65 FE) TAB at 08:18

## 2024-10-02 RX ADMIN — FAMOTIDINE 40 MG: 20 TABLET, FILM COATED ORAL at 08:18

## 2024-10-02 ASSESSMENT — LIFESTYLE VARIABLES
HOW MANY STANDARD DRINKS CONTAINING ALCOHOL DO YOU HAVE ON A TYPICAL DAY: PATIENT DOES NOT DRINK
HOW OFTEN DO YOU HAVE A DRINK CONTAINING ALCOHOL: NEVER

## 2024-10-02 ASSESSMENT — PAIN SCALES - GENERAL
PAINLEVEL_OUTOF10: 6
PAINLEVEL_OUTOF10: 6

## 2024-10-02 ASSESSMENT — PAIN DESCRIPTION - LOCATION
LOCATION: CHEST
LOCATION: CHEST

## 2024-10-02 NOTE — ED PROVIDER NOTES
Total critical care time (not including time spent performing separately reportable procedures):         Review of external notes and Independent historians utilized in decision making: External notes reviewed includeOutside medication list     Diagnostics independently interpreted by me: EKG see ED course for interpretation    Discussions with other clinicians and healthcare agents:  Admitting team with plan to admit    Risks considered in patient's treatment plan: IV medications given (see MAR)        HISTORY OF PRESENT ILLNESS   (Location/Symptom, Timing/Onset, Context/Setting, Quality, Duration, Modifying Factors, Severity)  Note limiting factors.   See MDM    Nursing Notes were reviewed.    REVIEW OF SYSTEMS    (2-9 systems for level 4, 10 or more for level 5)   See MDM    PAST MEDICAL HISTORY     Past Medical History:   Diagnosis Date    Asthma     CVA (cerebral vascular accident) (HCC) 11/9/2020    High cholesterol     Hypertension     Peptic ulcer 2000    on med- no problems since    Prostate cancer (HCC)     s/p XRT in summer 2023.    TGA (transient global amnesia) 12/22/2014    Vertigo Nov. 2012       SURGICAL HISTORY       Past Surgical History:   Procedure Laterality Date    COLONOSCOPY  2017    neg    HERNIA REPAIR      INSERTION SUBQ CARDIAC RHYTHM MONITOR W/PRGRMG N/A 2/26/2021    LOOP RECORDER INSERT performed by Linus Hernandez MD at [facility] CARDIAC CATH LAB    ORTHOPEDIC SURGERY      left shoulder june 2011    TONSILLECTOMY         CURRENT MEDICATIONS       Previous Medications    ALBUTEROL SULFATE HFA (PROVENTIL;VENTOLIN;PROAIR) 108 (90 BASE) MCG/ACT INHALER    Inhale 2 puffs into the lungs every 4 hours as needed for Wheezing    ATORVASTATIN (LIPITOR) 40 MG TABLET    TAKE 1 TABLET DAILY. NEED AN APPOINTMENT FOR MORE REFILLS    CLOPIDOGREL (PLAVIX) 75 MG TABLET    Take 1 tablet by mouth daily    CYANOCOBALAMIN 500 MCG TABLET    Take 500 mcg by mouth daily    FAMOTIDINE (PEPCID) 40 MG TABLET

## 2024-10-02 NOTE — CONSULTS
Pulmonary, Critical Care, and Sleep Medicine~Consult Note    Name: Juvenal Parra MRN: [id number]   : 1945 Hospital: [facility]   Date: 10/2/2024 12:39 PM Admission: 10/1/2024     Impression Plan   Abnormal CT scan: dense RML infiltrate with Right hilar LAD at 2cm; presumed infectious in origin but cannot rule out cancer at this time   Right hydronephrosis, right kidney cyst  Hx of prostate cancer, s/p radiation therapy   Asthma, no bronchospastic   Never smoker    Agree with doxy/rocephin to completion of course   O2 titration above 90%, on room air   Ongoing DVT/PUD proph  On Singulair   Will need repeat CT scan in a 4-6 wks to confirm resolution   We will be available again to see if needed      Daily Progression:    Consult Note requested by hospitalist service     Patient presented today secondary to worsening fatigue and flank pains.  On admission he was noted to have a white count that was elevated to 15.  Denies any cough or congestion.  No shortness of breath of any consequence.  Does have history of asthma is on rescue inhalers.  He is a never smoker and is not followed by an outpatient pulmonologist, that he is aware of.  CT scan on admission showed very dense consolidation in the right middle lobe with a right hilar lymphadenopathy measuring around 2 cm.  Of note chest x-ray done on 2024 showed no abnormalities in the right middle lobe.  No prior history of pulmonary disease. Denies any aspiration related episodes or GERD.     I have reviewed the labs and previous day’s notes.    Pertinent items are noted in HPI.  Past Medical History:   Diagnosis Date    Asthma     CVA (cerebral vascular accident) (HCC) 2020    High cholesterol     Hypertension     Peptic ulcer     on med- no problems since    Prostate cancer (HCC)     s/p XRT in summer .    TGA (transient global amnesia) 2014    Vertigo 2012      Past Surgical History:   Procedure Laterality Date 
atherosclerotic calcification. No abdominal aortic aneurysm.    REPRODUCTIVE: Prostate measures approximately 3.9 x 3.3 x 3.3 cm. Enhancement can be seen within the known left anterior apical transitional zone lesion.    BONES: No acute or aggressive osseous lesions. Chronic appearing mild anterior wedging of L1. Scattered degenerative Schmorl's nodes. No definitive evidence of osseous metastatic disease.          IMPRESSION:    1.  Punctate right lower pole calculus. No ureteral calculi. No hydronephrosis.    2.  No enhancing renal mass. Bilateral Bosniak type I cysts as well as a few subcentimeter left renal hypodensities which are too small to characterize but statistically likely represent cysts.    3.  No evidence of metastatic disease or lymphadenopathy.    4.  Incidental subcentimeter vascular lesion within the tip of the right lobe of the liver consistent with hemangioma.    5.  Diverticulosis with no active diverticulitis.    6.  Other chronic and incidental findings as described.          ELECTRONICALLY SIGNED BY: Randy Beltre MD      [id number]               \"Result Below...\"        RESULT: 1.  Punctate right lower pole calculus. No ureteral calculi. No hydronephrosis. 2.  No enhancing renal mass. Bilateral Bosniak type I cysts as well as a few subcentimeter left renal hypodensities which are too small to characterize but statistically likely represent cysts. 3.  No evidence of metastatic disease or lymphadenopathy. 4.  Incidental subcentimeter vascular lesion within the tip of the right lobe of the liver consistent with hemangioma. 5.  Diverticulosis with no active diverticulitis. 6.  Other chronic and incidental findings as described.    Note: An exclamation jan (!) indicates a result that was not dispersed into the flowsheet.  Document Creation Date: 05/10/2023 3:29 PM  _______________________________________________________________________    (1) Order result status: Final  Collection or

## 2024-10-02 NOTE — ED TRIAGE NOTES
Pt comes in from home with CC of right sided flank pain. Pt reports blood in urine and that started on Friday or Saturday. Also states he has trouble urinating and is painful. Increase in frequency.     Hx of prostate cancer 2 years ago.

## 2024-10-02 NOTE — H&P
ulcer 2000    on med- no problems since    Prostate cancer (HCC)     s/p XRT in summer 2023.    TGA (transient global amnesia) 12/22/2014    Vertigo Nov. 2012      Past Surgical History:   Procedure Laterality Date    COLONOSCOPY  2017    neg    HERNIA REPAIR      INSERTION SUBQ CARDIAC RHYTHM MONITOR W/PRGRMG N/A 2/26/2021    LOOP RECORDER INSERT performed by Linus Hernandez MD at [facility] CARDIAC CATH LAB    ORTHOPEDIC SURGERY      left shoulder june 2011    TONSILLECTOMY       Prior to Admission medications    Medication Sig Start Date End Date Taking? Authorizing Provider   albuterol sulfate HFA (PROVENTIL;VENTOLIN;PROAIR) 108 (90 Base) MCG/ACT inhaler Inhale 2 puffs into the lungs every 4 hours as needed for Wheezing 9/25/24   RUMA Parrish MD   triamcinolone (KENALOG) 0.1 % cream Apply topically 2 times daily as needed (prn) 6/18/24   RUMA Parrish MD   montelukast (SINGULAIR) 10 MG tablet Take 1 tablet by mouth daily 2/15/24   RUMA Parrish MD   atorvastatin (LIPITOR) 40 MG tablet TAKE 1 TABLET DAILY. NEED AN APPOINTMENT FOR MORE REFILLS 2/12/24   RUMA Parrish MD   clopidogrel (PLAVIX) 75 MG tablet Take 1 tablet by mouth daily 2/12/24   RUMA Parrish MD   famotidine (PEPCID) 40 MG tablet Take 1 tablet by mouth daily 2/12/24   RUMA Parrish MD   hydroCHLOROthiazide (HYDRODIURIL) 25 MG tablet Take 1 tablet by mouth daily 2/12/24   RUMA Parrish MD   levocetirizine (XYZAL) 5 MG tablet Take 1 tablet by mouth daily 2/12/24   RUMA Parrish MD   losartan (COZAAR) 100 MG tablet Take 1 tablet by mouth daily 2/12/24   RUMA Parrish MD   cyanocobalamin 500 MCG tablet Take 500 mcg by mouth daily    Automatic Reconciliation, Ar   ferrous sulfate (IRON 325) 325 (65 Fe) MG tablet Take 325 mg by mouth every morning (before breakfast)    Automatic Reconciliation, Ar   fluticasone (FLOVENT HFA) 110 MCG/ACT inhaler Inhale 2 puffs into the lungs

## 2024-10-02 NOTE — ED NOTES
0000 Patient assessed at this time no signs of distress MD at bedside.  0200 Patient sleeping no signs of distress.

## 2024-10-02 NOTE — ED NOTES
ED TO INPATIENT SBAR HANDOFF    Patient Name: Juvenal Parra   :  1945  79 y.o.   MRN:  [id number]  ED Room #:  ER20/20     Situation  Code Status: Full Code   Allergies: Patient has no known allergies.  Weight: Patient Vitals for the past 96 hrs (Last 3 readings):   Weight   10/02/24 0101 77.4 kg (170 lb 10.2 oz)   10/01/24 2221 77.4 kg (170 lb 10.2 oz)       Arrived from: home    Chief Complaint:   Chief Complaint   Patient presents with    Urinary Frequency    Hematuria       Hospital Problem/Diagnosis:  Principal Problem:    Sepsis ([facility])  Resolved Problems:    * No resolved hospital problems. *      Mobility: no current mobility problem   ED Fall Risk: Presents to emergency department  because of falls (Syncope, seizure, or loss of consciousness): No, Age > 70: Yes, Altered Mental Status, Intoxication with alcohol or substance confusion (Disorientation, impaired judgment, poor safety awaremess, or inability to follow instructions): No, Impaired Mobility: Ambulates or transfers with assistive devices or assistance; Unable to ambulate or transer.: No, Nursing Judgement: No   Fell in ED or prior to admission: no   Restraints: no     Sitter: no   Family/Caregiver Present: no    Neet to know social/safety information:      Background  History:   Past Medical History:   Diagnosis Date    Asthma     CVA (cerebral vascular accident) ([facility]) 2020    High cholesterol     Hypertension     Peptic ulcer     on med- no problems since    Prostate cancer ([facility])     s/p XRT in summer 2023.    TGA (transient global amnesia) 2014    Vertigo 2012       Assessment    Abnormal Assessment Findings: + nonproductive cough  Imaging:   CT CHEST WO CONTRAST   Final Result   Right middle lobe pneumonia and right hilar adenopathy. Short-term follow-up in   6-8 weeks suggested to exclude hilar/central malignancy.   Likely subacute to chronic compression fracture at T4. Suggest MRI thoracic   spine without contrast if

## 2024-10-03 VITALS
HEART RATE: 58 BPM | DIASTOLIC BLOOD PRESSURE: 76 MMHG | HEIGHT: 69 IN | RESPIRATION RATE: 20 BRPM | BODY MASS INDEX: 24.91 KG/M2 | TEMPERATURE: 98.4 F | OXYGEN SATURATION: 96 % | SYSTOLIC BLOOD PRESSURE: 114 MMHG | WEIGHT: 168.21 LBS

## 2024-10-03 LAB
ANION GAP SERPL CALC-SCNC: 5 MMOL/L (ref 2–12)
BACTERIA SPEC CULT: NORMAL
BUN SERPL-MCNC: 13 MG/DL (ref 6–20)
BUN/CREAT SERPL: 12 (ref 12–20)
CALCIUM SERPL-MCNC: 9.3 MG/DL (ref 8.5–10.1)
CHLORIDE SERPL-SCNC: 108 MMOL/L (ref 97–108)
CO2 SERPL-SCNC: 25 MMOL/L (ref 21–32)
CREAT SERPL-MCNC: 1.08 MG/DL (ref 0.7–1.3)
ERYTHROCYTE [DISTWIDTH] IN BLOOD BY AUTOMATED COUNT: 12.6 % (ref 11.5–14.5)
GLUCOSE SERPL-MCNC: 105 MG/DL (ref 65–100)
HCT VFR BLD AUTO: 29 % (ref 36.6–50.3)
HGB BLD-MCNC: 9.9 G/DL (ref 12.1–17)
MCH RBC QN AUTO: 32.6 PG (ref 26–34)
MCHC RBC AUTO-ENTMCNC: 34.1 G/DL (ref 30–36.5)
MCV RBC AUTO: 95.4 FL (ref 80–99)
NRBC # BLD: 0 K/UL (ref 0–0.01)
NRBC BLD-RTO: 0 PER 100 WBC
PLATELET # BLD AUTO: 153 K/UL (ref 150–400)
PMV BLD AUTO: 9.9 FL (ref 8.9–12.9)
POTASSIUM SERPL-SCNC: 4 MMOL/L (ref 3.5–5.1)
RBC # BLD AUTO: 3.04 M/UL (ref 4.1–5.7)
SERVICE CMNT-IMP: NORMAL
SODIUM SERPL-SCNC: 138 MMOL/L (ref 136–145)
TROPONIN I SERPL HS-MCNC: 11 NG/L (ref 0–76)
WBC # BLD AUTO: 6.7 K/UL (ref 4.1–11.1)

## 2024-10-03 PROCEDURE — 2580000003 HC RX 258: Performed by: INTERNAL MEDICINE

## 2024-10-03 PROCEDURE — 6360000002 HC RX W HCPCS: Performed by: INTERNAL MEDICINE

## 2024-10-03 PROCEDURE — 85027 COMPLETE CBC AUTOMATED: CPT

## 2024-10-03 PROCEDURE — 2500000003 HC RX 250 WO HCPCS: Performed by: INTERNAL MEDICINE

## 2024-10-03 PROCEDURE — 84484 ASSAY OF TROPONIN QUANT: CPT

## 2024-10-03 PROCEDURE — 80048 BASIC METABOLIC PNL TOTAL CA: CPT

## 2024-10-03 PROCEDURE — 2500000003 HC RX 250 WO HCPCS

## 2024-10-03 PROCEDURE — 6370000000 HC RX 637 (ALT 250 FOR IP): Performed by: INTERNAL MEDICINE

## 2024-10-03 RX ORDER — DOXYCYCLINE HYCLATE 100 MG
100 TABLET ORAL 2 TIMES DAILY
Qty: 8 TABLET | Refills: 0 | Status: SHIPPED | OUTPATIENT
Start: 2024-10-03 | End: 2024-10-07

## 2024-10-03 RX ORDER — BENZONATATE 100 MG/1
100 CAPSULE ORAL 3 TIMES DAILY PRN
Qty: 21 CAPSULE | Refills: 0 | Status: SHIPPED | OUTPATIENT
Start: 2024-10-03 | End: 2024-10-10

## 2024-10-03 RX ORDER — CEFUROXIME AXETIL 250 MG/1
250 TABLET ORAL 2 TIMES DAILY
Qty: 8 TABLET | Refills: 0 | Status: SHIPPED | OUTPATIENT
Start: 2024-10-03 | End: 2024-10-07

## 2024-10-03 RX ORDER — GUAIFENESIN 600 MG/1
600 TABLET, EXTENDED RELEASE ORAL 2 TIMES DAILY PRN
Qty: 14 TABLET | Refills: 0 | Status: SHIPPED | OUTPATIENT
Start: 2024-10-03 | End: 2024-10-10

## 2024-10-03 RX ADMIN — GUAIFENESIN 200 MG: 200 SOLUTION ORAL at 05:24

## 2024-10-03 RX ADMIN — ENOXAPARIN SODIUM 40 MG: 100 INJECTION SUBCUTANEOUS at 08:35

## 2024-10-03 RX ADMIN — SODIUM CHLORIDE, PRESERVATIVE FREE 10 ML: 5 INJECTION INTRAVENOUS at 08:36

## 2024-10-03 RX ADMIN — CLOPIDOGREL BISULFATE 75 MG: 75 TABLET ORAL at 08:35

## 2024-10-03 RX ADMIN — FAMOTIDINE 40 MG: 20 TABLET, FILM COATED ORAL at 08:35

## 2024-10-03 RX ADMIN — WATER 1000 MG: 1 INJECTION INTRAMUSCULAR; INTRAVENOUS; SUBCUTANEOUS at 02:08

## 2024-10-03 RX ADMIN — OXYCODONE 5 MG: 5 TABLET ORAL at 05:23

## 2024-10-03 RX ADMIN — FERROUS SULFATE TAB 325 MG (65 MG ELEMENTAL FE) 325 MG: 325 (65 FE) TAB at 05:24

## 2024-10-03 RX ADMIN — DOXYCYCLINE 100 MG: 100 INJECTION, POWDER, LYOPHILIZED, FOR SOLUTION INTRAVENOUS at 08:34

## 2024-10-03 RX ADMIN — MONTELUKAST 10 MG: 10 TABLET, FILM COATED ORAL at 08:34

## 2024-10-03 ASSESSMENT — PAIN SCALES - GENERAL
PAINLEVEL_OUTOF10: 3
PAINLEVEL_OUTOF10: 6

## 2024-10-03 ASSESSMENT — PAIN DESCRIPTION - DESCRIPTORS: DESCRIPTORS: ACHING

## 2024-10-03 ASSESSMENT — PAIN DESCRIPTION - LOCATION: LOCATION: CHEST

## 2024-10-03 NOTE — PROGRESS NOTES
Patient: Juvenal Parra MRN: [id number]  SSN: [id number]    YOB: 1945  Age: 79 y.o.  Sex: male        ADMITTED: 10/1/2024 to Marlene Steiner MD by Salo Jeter MD for Abnormal CXR [R93.89]  UTI symptoms [R39.9]  Sepsis (HCC) [A41.9]  Severe sepsis (HCC) [A41.9, R65.20]  Hydronephrosis, unspecified hydronephrosis type [N13.30]  Pneumonia of right middle lobe due to infectious organism [J18.9]  Hematuria, unspecified type [R31.9]  POD# * No surgery found *     Juvenal Parra is doing better this morning .  KUB reviewed shows no contrast retained in the kidneys.  Urine culture showed no growth.  Patient was complaining of back or flank pain    Afebrile   Wbc 6.7   Cr 1.08  T 98.4    Vitals: Temp (24hrs), Av.9 °F (37.2 °C), Min:98.8 °F (37.1 °C), Max:99.1 °F (37.3 °C)    Blood pressure 117/68, pulse 73, temperature 99.1 °F (37.3 °C), temperature source Oral, resp. rate 18, height 1.753 m (5' 9\"), weight 76.3 kg (168 lb 3.4 oz), SpO2 92%.    Intake and Output:  No intake/output data recorded.  No intake/output data recorded.  ALFONSO Output lats 24 hrs: No data found.   ALFONSO Output last 8 hrs: No data found.  BM over last 24 hrs: No data found.    Exam:   Gen: NAD  CV: extremities well perfused  Lungs: nonlabored respirations. Symmetric chest expansion  Ext: no edema  Abdomen: soft NTND No CVAT   : voiding     Labs:  CBC:   Lab Results   Component Value Date/Time    WBC 6.7 10/03/2024 06:39 AM    HCT 29.0 10/03/2024 06:39 AM     10/03/2024 06:39 AM     BMP:   Lab Results   Component Value Date/Time     10/02/2024 07:25 AM    K 3.3 10/02/2024 07:25 AM     10/02/2024 07:25 AM    CO2 27 10/02/2024 07:25 AM    BUN 12 10/02/2024 07:25 AM     Cultures:   Results       Procedure Component Value Units Date/Time    COVID-19, Rapid [[id number]] Collected: 10/02/24 2216    Order Status: Completed Specimen: Nasopharyngeal Updated: 10/02/24 2308     Source Nasopharyngeal        
OT order received, chart reviewed. Patient up ad summer, coming out of bathroom upon OT arrival. Patient is independent with ADL and mobility, without use of assistive device. No acute OT services indicated.   Chela Chen, OTR/L  
(XYZAL) 5 MG tablet Take 1 tablet by mouth daily    losartan (COZAAR) 100 MG tablet Take 1 tablet by mouth daily    cyanocobalamin 500 MCG tablet Take 500 mcg by mouth daily    ferrous sulfate (IRON 325) 325 (65 Fe) MG tablet Take 325 mg by mouth every morning (before breakfast)    fluticasone (FLOVENT HFA) 110 MCG/ACT inhaler Inhale 2 puffs into the lungs every 12 hours     ______________________________________________________________________  EXPECTED LENGTH OF STAY: 3  ACTUAL LENGTH OF STAY:          0                 Ana Rosa Paul, BLANCA - CNP

## 2024-10-03 NOTE — DISCHARGE SUMMARY
Discharge Summary       PATIENT ID: Juvenal Parra  MRN: [id number]   YOB: 1945    DATE OF ADMISSION: 10/1/2024 10:40 PM    DATE OF DISCHARGE: 10/03/24    PRIMARY CARE PROVIDER: RUMA Parrish MD     ATTENDING PHYSICIAN: Marlene Steiner MD   DISCHARGING PROVIDER: Marlene Steiner MD    To contact this individual call [phone] and ask the  to page.  If unavailable ask to be transferred the Adult Hospitalist Department.    CONSULTATIONS: IP CONSULT TO HOSPITALIST  IP CONSULT TO UROLOGY  IP CONSULT TO PULMONOLOGY    PROCEDURES/SURGERIES: * No surgery found *     ADMITTING DIAGNOSES & HOSPITAL COURSE:   HPI: \" Juvenal Parra is a 79 y.o. male with past medical history significant for asthma, dyslipidemia, prostate cancer s/p radiation therapy presented at the emergency room with right flank pain.  Patient symptoms started few days ago and progressively getting worse.  This is associated with urinary frequency, urgency and dysuria.  Patient has also noticed some scant blood in his urine.  The right flank pain is constant, sharp, no radiation, 4/10 in severity, no known aggravating or relieving factors.  Patient had a fever at home as high as 102.  He presented at the emergency room with leukocytosis, elevated lactic acid level and CT scan of the abdomen and pelvis showed right hydronephrosis as well as right middle lobe airspace disease.  Patient met sepsis criteria and sepsis protocol was initiated including administration of fluid and since the sepsis is most likely coming from pneumonia patient was started on Rocephin and Zithromax and was referred to the hospitalist service for admission.\"        DISCHARGE DIAGNOSES / PLAN:      Sepsis  -Right middle lobe pneumonia on CT, suspected bacterial  -Continue doxycycline and Rocephin  -Appreciate pulmonary input: Will need a repeat CT scan in 4 to 6 weeks to confirm resolution  -Continue Singulair  -Suspected  component as well

## 2024-10-06 LAB
BACTERIA SPEC CULT: NORMAL
BACTERIA SPEC CULT: NORMAL
SERVICE CMNT-IMP: NORMAL
SERVICE CMNT-IMP: NORMAL

## 2024-10-07 LAB
BACTERIA SPEC CULT: NORMAL
SERVICE CMNT-IMP: NORMAL

## 2024-10-11 ENCOUNTER — HOSPITAL ENCOUNTER (OUTPATIENT)
Facility: HOSPITAL | Age: 79
Discharge: HOME | End: 2024-10-14
Payer: MEDICARE

## 2024-10-11 DIAGNOSIS — J18.9 PNEUMONIA DUE TO INFECTIOUS ORGANISM, UNSPECIFIED LATERALITY, UNSPECIFIED PART OF LUNG: ICD-10-CM

## 2024-10-11 PROBLEM — A41.9 SEPSIS (HCC): Status: RESOLVED | Noted: 2024-10-02 | Resolved: 2024-10-11

## 2024-10-11 PROCEDURE — 71046 X-RAY EXAM CHEST 2 VIEWS: CPT

## 2024-10-14 ENCOUNTER — TRANSCRIBE ORDERS (OUTPATIENT)
Facility: HOSPITAL | Age: 79
End: 2024-10-14

## 2024-10-14 DIAGNOSIS — J18.9 PNEUMONIA DUE TO INFECTIOUS ORGANISM, UNSPECIFIED LATERALITY, UNSPECIFIED PART OF LUNG: Primary | ICD-10-CM

## 2024-11-01 ENCOUNTER — TELEPHONE (OUTPATIENT)
Age: 79
End: 2024-11-01

## 2024-11-01 NOTE — TELEPHONE ENCOUNTER
Called pt for manual reset on Loop recorder for updated EGMs. ID verified using two patient identifiers. Pt states he is not at home and his device is in a  his bedroom. Prelert message sent with send instructions. Offered to schedule follow up appt with Dr Hernandez, pt declined at this time.    Opportunities for questions, clarifications, and concerns provided.

## 2024-11-12 ENCOUNTER — HOSPITAL ENCOUNTER (OUTPATIENT)
Facility: HOSPITAL | Age: 79
Discharge: HOME | End: 2024-11-15
Attending: INTERNAL MEDICINE
Payer: MEDICARE

## 2024-11-12 DIAGNOSIS — J18.9 PNEUMONIA DUE TO INFECTIOUS ORGANISM, UNSPECIFIED LATERALITY, UNSPECIFIED PART OF LUNG: ICD-10-CM

## 2024-11-12 PROCEDURE — 71250 CT THORAX DX C-: CPT

## 2024-11-14 DIAGNOSIS — C61 PROSTATE CANCER (HCC): Primary | ICD-10-CM

## 2024-12-18 ENCOUNTER — HOSPITAL ENCOUNTER (OUTPATIENT)
Facility: HOSPITAL | Age: 79
Discharge: HOME | End: 2024-12-21

## 2024-12-18 VITALS
HEIGHT: 69 IN | SYSTOLIC BLOOD PRESSURE: 147 MMHG | DIASTOLIC BLOOD PRESSURE: 89 MMHG | WEIGHT: 160 LBS | RESPIRATION RATE: 16 BRPM | BODY MASS INDEX: 23.7 KG/M2 | HEART RATE: 64 BPM

## 2024-12-18 DIAGNOSIS — C61 PROSTATE CANCER (HCC): Primary | ICD-10-CM

## 2024-12-18 ASSESSMENT — PAIN SCALES - GENERAL: PAINLEVEL_OUTOF10: 0

## 2024-12-18 NOTE — CONSULTS
RADIATION ONCOLOGY PROGRESS NOTE    Patient Name: Juvenal Parra  Patient YOB: 1945   Medical Record Number: [id number]  Referring Physician: Stone Rasmussen MD  [address] [id number]  Primary Care Provider: RUMA Parrish MD    DIAGNOSIS & STAGING:  Cancer Staging   Prostate cancer (HCC)  Staging form: Prostate, AJCC 8th Edition  - Clinical stage from 3/2/2023: Stage IIB (cT2b, cN0, cM0, PSA: 4.1, Grade Group: 2) - Unsigned      ICD-10-CM    1. Prostate cancer (HCC)  C61         AJCC Staging has been reviewed      CHIEF COMPLAINT: Unfavorable intermediate risk prostate cancer, Bren 3 + 4, PSA 4.14, cT2b. Definitive radiation with prostate SBRT, 7.25 Gy x5, with CTV 8 Gy x 5, completed 7/18/2023.  Clinical trial volunteer for NRG , Decipher 0.17 low, randomized to Arm 1: RT alone.  [facility] chart #[id number] (old chart #[id number])    RADIATION HISTORY:    Treatment Summary:  Course: C1  Treatment Site Ref. ID Energy Dose/Fx (cGy) #Fx Dose Correction (cGy) Total Dose (cGy) Start Date End Date Elapsed Days   [id number] 6X 725 5 / 5 0 3,625 7/6/2023 7/18/2023 12        RETURN VISITS:    7/6/2023: Seen today after his first fraction of prostate SBRT. We reviewed the expected short-term side effects of radiation.  We reviewed the follow-up plan.  He had 3 days of tamsulosin around the time of his biopsy.  He denies any side effects that he noticed from that.  I will write him tamsulosin in anticipation of urinary symptoms.  We reviewed the appropriate use of this medication and precautions around it, including the transient hypotension sometimes experienced with the first few doses, how to identify this and manage it if so.  He and his wife have their young grandkids this weekend, and he asked about any radiation safety precautions.  This is x-ray treatment, he is not radioactive, and he does not have to have any radiation precautions

## 2025-06-11 ENCOUNTER — HOSPITAL ENCOUNTER (OUTPATIENT)
Facility: HOSPITAL | Age: 80
Discharge: HOME | End: 2025-06-14

## 2025-06-11 VITALS
SYSTOLIC BLOOD PRESSURE: 119 MMHG | HEART RATE: 74 BPM | DIASTOLIC BLOOD PRESSURE: 79 MMHG | HEIGHT: 69 IN | WEIGHT: 158 LBS | BODY MASS INDEX: 23.4 KG/M2

## 2025-06-11 DIAGNOSIS — C61 PROSTATE CANCER (HCC): Primary | ICD-10-CM

## 2025-06-11 ASSESSMENT — PAIN SCALES - GENERAL: PAINLEVEL_OUTOF10: 0

## 2025-06-11 NOTE — CONSULTS
CVA age 52's       SOCIAL HISTORY:   Social History     Occupational History    Not on file   Tobacco Use    Smoking status: Never    Smokeless tobacco: Never   Substance and Sexual Activity    Alcohol use: Yes     Alcohol/week: 7.0 standard drinks of alcohol     Types: 7 Drinks containing 0.5 oz of alcohol per week    Drug use: No    Sexual activity: Yes     Partners: Female       ALLERGIES/MEDICATIONS:  No Known Allergies  Current Outpatient Medications   Medication Sig Dispense Refill    clopidogrel (PLAVIX) 75 MG tablet TAKE 1 TABLET DAILY 90 tablet 3    losartan (COZAAR) 100 MG tablet TAKE 1 TABLET DAILY 90 tablet 3    levocetirizine (XYZAL) 5 MG tablet TAKE 1 TABLET DAILY 90 tablet 3    hydroCHLOROthiazide (HYDRODIURIL) 25 MG tablet TAKE 1 TABLET DAILY 90 tablet 3    montelukast (SINGULAIR) 10 MG tablet TAKE 1 TABLET DAILY 90 tablet 3    famotidine (PEPCID) 40 MG tablet TAKE 1 TABLET DAILY 90 tablet 3    atorvastatin (LIPITOR) 40 MG tablet TAKE 1 TABLET DAILY 90 tablet 3    albuterol sulfate HFA (PROVENTIL;VENTOLIN;PROAIR) 108 (90 Base) MCG/ACT inhaler Inhale 2 puffs into the lungs every 4 hours as needed for Wheezing 6.7 each 3    triamcinolone (KENALOG) 0.1 % cream Apply topically 2 times daily as needed (prn) 60 g 2    cyanocobalamin 500 MCG tablet Take 500 mcg by mouth daily      ferrous sulfate (IRON 325) 325 (65 Fe) MG tablet Take 325 mg by mouth every morning (before breakfast)      fluticasone (FLOVENT HFA) 110 MCG/ACT inhaler Inhale 2 puffs into the lungs every 12 hours       No current facility-administered medications for this encounter.

## (undated) DEVICE — Device

## (undated) DEVICE — DERMABOND SKIN ADH 0.7ML -- DERMABOND ADVANCED 12/BX

## (undated) DEVICE — APPLICATOR MEDICATED 10.5 CC SOLUTION CLR STRL CHLORAPREP

## (undated) DEVICE — Z CONVERTED USE 2276060 DRESSING NONADHESIVE W3XL4IN WHT COT OUCHLSS RECT BONDED